# Patient Record
Sex: MALE | Race: WHITE | NOT HISPANIC OR LATINO | Employment: OTHER | URBAN - METROPOLITAN AREA
[De-identification: names, ages, dates, MRNs, and addresses within clinical notes are randomized per-mention and may not be internally consistent; named-entity substitution may affect disease eponyms.]

---

## 2017-01-01 ENCOUNTER — OFFICE VISIT (OUTPATIENT)
Dept: FAMILY MEDICINE | Facility: CLINIC | Age: 77
End: 2017-01-01
Payer: COMMERCIAL

## 2017-01-01 ENCOUNTER — AMBULATORY - HEALTHEAST (OUTPATIENT)
Dept: CARDIOLOGY | Facility: CLINIC | Age: 77
End: 2017-01-01

## 2017-01-01 ENCOUNTER — COMMUNICATION - HEALTHEAST (OUTPATIENT)
Dept: INFUSION THERAPY | Facility: HOSPITAL | Age: 77
End: 2017-01-01

## 2017-01-01 ENCOUNTER — OFFICE VISIT (OUTPATIENT)
Dept: FAMILY MEDICINE | Facility: CLINIC | Age: 77
End: 2017-01-01

## 2017-01-01 ENCOUNTER — TRANSFERRED RECORDS (OUTPATIENT)
Dept: HEALTH INFORMATION MANAGEMENT | Facility: CLINIC | Age: 77
End: 2017-01-01

## 2017-01-01 ENCOUNTER — TELEPHONE (OUTPATIENT)
Dept: FAMILY MEDICINE | Facility: CLINIC | Age: 77
End: 2017-01-01

## 2017-01-01 VITALS
BODY MASS INDEX: 32.15 KG/M2 | HEART RATE: 60 BPM | WEIGHT: 257.2 LBS | TEMPERATURE: 97.5 F | DIASTOLIC BLOOD PRESSURE: 75 MMHG | SYSTOLIC BLOOD PRESSURE: 151 MMHG

## 2017-01-01 VITALS
BODY MASS INDEX: 32.75 KG/M2 | DIASTOLIC BLOOD PRESSURE: 76 MMHG | SYSTOLIC BLOOD PRESSURE: 154 MMHG | WEIGHT: 262 LBS | HEART RATE: 60 BPM | TEMPERATURE: 97.9 F

## 2017-01-01 DIAGNOSIS — M10.9 ACUTE GOUT, UNSPECIFIED CAUSE, UNSPECIFIED SITE: ICD-10-CM

## 2017-01-01 DIAGNOSIS — Z95.0 CARDIAC PACEMAKER IN SITU: ICD-10-CM

## 2017-01-01 DIAGNOSIS — Z23 NEED FOR VACCINATION: Primary | ICD-10-CM

## 2017-01-01 DIAGNOSIS — K21.00 GASTROESOPHAGEAL REFLUX DISEASE WITH ESOPHAGITIS: ICD-10-CM

## 2017-01-01 DIAGNOSIS — R60.0 PEDAL EDEMA: ICD-10-CM

## 2017-01-01 DIAGNOSIS — R10.84 GENERALIZED ABDOMINAL PAIN: ICD-10-CM

## 2017-01-01 DIAGNOSIS — R10.84 ABDOMINAL PAIN, GENERALIZED: Primary | ICD-10-CM

## 2017-01-01 DIAGNOSIS — E78.00 PURE HYPERCHOLESTEROLEMIA: ICD-10-CM

## 2017-01-01 DIAGNOSIS — R10.84 ABDOMINAL PAIN, GENERALIZED: ICD-10-CM

## 2017-01-01 DIAGNOSIS — I48.20 CHRONIC ATRIAL FIBRILLATION (H): ICD-10-CM

## 2017-01-01 LAB
HCC DEVICE COMMENTS: NORMAL
HCC DEVICE COMMENTS: NORMAL

## 2017-01-01 RX ORDER — HYDROCODONE BITARTRATE AND ACETAMINOPHEN 5; 325 MG/1; MG/1
TABLET ORAL
Qty: 59 TABLET | Refills: 0 | Status: SHIPPED | OUTPATIENT
Start: 2017-01-01 | End: 2018-01-01

## 2017-01-01 RX ORDER — FUROSEMIDE 40 MG
80 TABLET ORAL DAILY
Qty: 90 TABLET | Refills: 0 | Status: SHIPPED | OUTPATIENT
Start: 2017-01-01 | End: 2018-01-01

## 2017-01-01 RX ORDER — HYDROCODONE BITARTRATE AND ACETAMINOPHEN 5; 325 MG/1; MG/1
TABLET ORAL
Qty: 18 TABLET | Refills: 0 | Status: SHIPPED | OUTPATIENT
Start: 2017-01-01 | End: 2017-01-01

## 2017-01-01 RX ORDER — ATORVASTATIN CALCIUM 20 MG/1
20 TABLET, FILM COATED ORAL DAILY
Qty: 90 TABLET | Refills: 0 | Status: SHIPPED | OUTPATIENT
Start: 2017-01-01 | End: 2018-01-01

## 2017-01-01 RX ORDER — CARVEDILOL 12.5 MG/1
12.5 TABLET ORAL 2 TIMES DAILY WITH MEALS
Qty: 180 TABLET | Refills: 11 | Status: SHIPPED | OUTPATIENT
Start: 2017-01-01

## 2017-01-01 RX ORDER — ACETAMINOPHEN 325 MG/1
TABLET ORAL
Qty: 60 TABLET | Refills: 1 | Status: SHIPPED | OUTPATIENT
Start: 2017-01-01

## 2017-01-01 RX ORDER — PREDNISONE 20 MG/1
20 TABLET ORAL DAILY
Qty: 5 TABLET | Refills: 0 | Status: SHIPPED | OUTPATIENT
Start: 2017-01-01 | End: 2017-01-01

## 2017-01-01 ASSESSMENT — MIFFLIN-ST. JEOR
SCORE: 1968.6
SCORE: 1949.55

## 2017-01-02 DIAGNOSIS — E78.00 PURE HYPERCHOLESTEROLEMIA: Primary | ICD-10-CM

## 2017-01-04 ENCOUNTER — AMBULATORY - HEALTHEAST (OUTPATIENT)
Dept: CARDIOLOGY | Facility: CLINIC | Age: 77
End: 2017-01-04

## 2017-01-04 ENCOUNTER — TRANSFERRED RECORDS (OUTPATIENT)
Dept: HEALTH INFORMATION MANAGEMENT | Facility: CLINIC | Age: 77
End: 2017-01-04

## 2017-01-04 RX ORDER — ATORVASTATIN CALCIUM 20 MG/1
20 TABLET, FILM COATED ORAL DAILY
Qty: 90 TABLET | Refills: 0 | Status: SHIPPED | OUTPATIENT
Start: 2017-01-04 | End: 2017-04-06

## 2017-01-05 DIAGNOSIS — K21.00 GASTROESOPHAGEAL REFLUX DISEASE WITH ESOPHAGITIS: Primary | ICD-10-CM

## 2017-01-20 ENCOUNTER — TRANSFERRED RECORDS (OUTPATIENT)
Dept: HEALTH INFORMATION MANAGEMENT | Facility: CLINIC | Age: 77
End: 2017-01-20

## 2017-01-20 ENCOUNTER — AMBULATORY - HEALTHEAST (OUTPATIENT)
Dept: CARDIOLOGY | Facility: CLINIC | Age: 77
End: 2017-01-20

## 2017-01-24 ENCOUNTER — OFFICE VISIT (OUTPATIENT)
Dept: FAMILY MEDICINE | Facility: CLINIC | Age: 77
End: 2017-01-24

## 2017-01-24 VITALS
SYSTOLIC BLOOD PRESSURE: 153 MMHG | DIASTOLIC BLOOD PRESSURE: 73 MMHG | WEIGHT: 271.4 LBS | TEMPERATURE: 97.4 F | HEART RATE: 61 BPM | BODY MASS INDEX: 33.92 KG/M2

## 2017-01-24 DIAGNOSIS — K57.30 DIVERTICULOSIS OF LARGE INTESTINE WITHOUT HEMORRHAGE: Primary | ICD-10-CM

## 2017-01-24 RX ORDER — HYDROCODONE BITARTRATE AND ACETAMINOPHEN 5; 325 MG/1; MG/1
1-2 TABLET ORAL EVERY 4 HOURS PRN
Qty: 31 TABLET | Refills: 0 | Status: SHIPPED | OUTPATIENT
Start: 2017-01-24 | End: 2017-02-22

## 2017-01-24 NOTE — MR AVS SNAPSHOT
After Visit Summary   2017    Eliana Bethea    MRN: [id number]           Patient Information     Date Of Birth          1940        Visit Information        Provider Department      2017 9:40 AM Betty Mcrae MD [facility]        Today's Diagnoses     Diverticulosis of large intestine without hemorrhage    -  1        Follow-ups after your visit        Your next 10 appointments already scheduled     2017  9:40 AM   Return Visit with Betty Mcrae MD   [facility] ([facility])    [address] [place] [id number]   [phone]              Who to contact     Please call your clinic at [phone] to:    Ask questions about your health    Make or cancel appointments    Discuss your medicines    Learn about your test results    Speak to your doctor   If you have compliments or concerns about an experience at your clinic, or if you wish to file a complaint, please contact [facility] Patient Relations at [phone] or email us at [email or web address]         Additional Information About Your Visit        MyChart Information     Medicalis is an electronic gateway that provides easy, online access to your medical records. With Medicalis, you can request a clinic appointment, read your test results, renew a prescription or communicate with your care team.     To sign up for Medicalis visit the website at [email or web address]   You will be asked to enter the access code listed below, as well as some personal information. Please follow the directions to create your username and password.     Your access code is: [email or web address]  Expires: 3/20/2017 10:11 AM     Your access code will  in 90 days. If you need help or a new code, please contact your [facility] or call [phone] for assistance.        Care EveryWhere ID     This is your Care EveryWhere ID. This could be used by other organizations to  access your [place] medical records  [id number]        Your Vitals Were     Pulse Temperature                61 97.4  F (36.3  C) (Oral)           Blood Pressure from Last 3 Encounters:   01/24/17 153/73   12/20/16 148/80   11/22/16 143/79    Weight from Last 3 Encounters:   01/24/17 271 lb 6.4 oz (123.106 kg)   12/20/16 271 lb 3.2 oz (123.016 kg)   11/22/16 269 lb 12.8 oz (122.38 kg)              Today, you had the following     No orders found for display         Today's Medication Changes          These changes are accurate as of: 1/24/17  9:38 AM.  If you have any questions, ask your nurse or doctor.               These medicines have changed or have updated prescriptions.        Dose/Directions    * HYDROcodone-acetaminophen 5-325 MG per tablet   Commonly known as:  NORCO   This may have changed:  Another medication with the same name was added. Make sure you understand how and when to take each.   Used for:  Diverticulosis of large intestine without hemorrhage   Changed by:  Moshe Corley MD        1 to 2 per day as needed for abdominal pain   Quantity:  41 tablet   Refills:  0       * HYDROcodone-acetaminophen 5-325 MG per tablet   Commonly known as:  NORCO   This may have changed:  Another medication with the same name was added. Make sure you understand how and when to take each.   Used for:  Diverticulosis of large intestine without hemorrhage   Changed by:  Moshe Corley MD        1-2 for abdominal pain as needed   Quantity:  31 tablet   Refills:  0       * HYDROcodone-acetaminophen 5-325 MG per tablet   Commonly known as:  NORCO   This may have changed:  You were already taking a medication with the same name, and this prescription was added. Make sure you understand how and when to take each.   Used for:  Diverticulosis of large intestine without hemorrhage   Changed by:  Betty Mcrae MD        Dose:  1-2 tablet   Take 1-2 tablets by mouth every 4 hours as needed for moderate to severe pain  maximum 4 tablet(s) per day   Quantity:  31 tablet   Refills:  0       * Notice:  This list has 3 medication(s) that are the same as other medications prescribed for you. Read the directions carefully, and ask your doctor or other care provider to review them with you.         Where to get your medicines      Some of these will need a paper prescription and others can be bought over the counter.  Ask your nurse if you have questions.     Bring a paper prescription for each of these medications    - HYDROcodone-acetaminophen 5-325 MG per tablet             Primary Care Provider Office Phone # Fax #    Moshe Corley [phone]       [address] [address]        Thank you!     Thank you for choosing [facility]  for your care. Our goal is always to provide you with excellent care. Hearing back from our patients is one way we can continue to improve our services. Please take a few minutes to complete the written survey that you may receive in the mail after your visit with us. Thank you!             Your Updated Medication List - Protect others around you: Learn how to safely use, store and throw away your medicines at www.disposemymeds.org.          This list is accurate as of: 1/24/17  9:38 AM.  Always use your most recent med list.                   Brand Name Dispense Instructions for use    * acetaminophen 325 MG tablet    TYLENOL    60 tablet    Twice a day as needed for  abdominal pain       * acetaminophen 325 MG tablet    TYLENOL    60 tablet    650 mg bid as needed for  Pain NO MORE THAN 1300 mg per day       amLODIPine 2.5 MG tablet    NORVASC    30 tablet    Take 2 tablets (5 mg) by mouth daily       atorvastatin 20 MG tablet    LIPITOR    90 tablet    Take 1 tablet (20 mg) by mouth daily       carvedilol 12.5 MG tablet    COREG    180 tablet    Take 1 tablet (12.5 mg) by mouth 2 times daily (with meals)       furosemide 40 MG tablet    LASIX    90  tablet    Take 2 tablets (80 mg) by mouth daily       * HYDROcodone-acetaminophen 5-325 MG per tablet    NORCO    41 tablet    1 to 2 per day as needed for abdominal pain       * HYDROcodone-acetaminophen 5-325 MG per tablet    NORCO    31 tablet    1-2 for abdominal pain as needed       * HYDROcodone-acetaminophen 5-325 MG per tablet    NORCO    31 tablet    Take 1-2 tablets by mouth every 4 hours as needed for moderate to severe pain maximum 4 tablet(s) per day       lisinopril 40 MG tablet    PRINIVIL/ZESTRIL    30 tablet    Take 1 tablet (40 mg) by mouth daily Discontinue lisinopril 30 mg daily.  (dose increase)       omeprazole 20 MG CR capsule    priLOSEC    90 capsule    Take 1 capsule (20 mg) by mouth daily       rivaroxaban ANTICOAGULANT 20 MG Tabs tablet    XARELTO    30 tablet    Take 1 tablet (20 mg) by mouth daily (with dinner)       Simethicone 125 MG Caps     28 capsule    1 to 3 per day  Per gas pain       spironolactone 25 MG tablet    ALDACTONE    30 tablet    Take 1 tablet (25 mg) by mouth daily       * Notice:  This list has 5 medication(s) that are the same as other medications prescribed for you. Read the directions carefully, and ask your doctor or other care provider to review them with you.

## 2017-01-24 NOTE — PROGRESS NOTES
Preceptor attestation:  Patient seen and discussed with the resident.  Assessment and plan reviewed with resident and agreed upon.  Supervising physician: Alfonso Rico

## 2017-01-24 NOTE — Clinical Note
Will have our referral desk see if they can track down US and CT ordered by [facility] earlier this month, as the patient would like to review it with Dr. Corley

## 2017-01-25 ENCOUNTER — TELEPHONE (OUTPATIENT)
Dept: FAMILY MEDICINE | Facility: CLINIC | Age: 77
End: 2017-01-25

## 2017-01-25 NOTE — TELEPHONE ENCOUNTER
Questions the patient's xarelto can be put on on for 3 days prior to bx of a cyst he has. Please advise. /JAM Chandra  Routed to Pharm D  /JAM Chandra  .

## 2017-01-25 NOTE — TELEPHONE ENCOUNTER
[facility] phone call message- general phone call:    Reason for call: Would like to speak with a nurse or Dr. Corley in regards to trying to hold the patient's medication for his biopsy.     Return call needed: Yes    OK to leave a message on voice mail? Yes    Primary language: English      needed? No    Call taken on January 25, 2017 at 1:50 PM by Jose Manuel Kelley

## 2017-01-26 NOTE — TELEPHONE ENCOUNTER
This is ok.  No need to bridge.  I called Sparkle back to let her know this.  Elizabeth Ayers, Pharm.D.

## 2017-01-31 ENCOUNTER — TELEPHONE (OUTPATIENT)
Dept: FAMILY MEDICINE | Facility: CLINIC | Age: 77
End: 2017-01-31

## 2017-01-31 DIAGNOSIS — J06.9 UPPER RESPIRATORY TRACT INFECTION, UNSPECIFIED TYPE: Primary | ICD-10-CM

## 2017-01-31 RX ORDER — GUAIFENESIN 200 MG/10ML
10 LIQUID ORAL 2 TIMES DAILY PRN
Qty: 118 ML | Refills: 0 | Status: SHIPPED | OUTPATIENT
Start: 2017-01-31 | End: 2017-07-20

## 2017-01-31 NOTE — TELEPHONE ENCOUNTER
Pt c/o nasal congestion and coughing for a wk now. Productive cough. Denies fevers, chills, vomiting. Able to drink water and eat food. Pt has not been taking any meds. Pt would like Dr. Corley to send something for cough and congestion. Advised pt on home comfort measures such as water, rest, hot water and honey. Pt states he has endoscopy scheduled soon and would like med to help with his symptoms.     Would like Rx sent to [facility] on [address] in [place].    Routed to Dr. Corley. /JAM Albarran

## 2017-01-31 NOTE — TELEPHONE ENCOUNTER
Gave msg to pt. guaifenesin sent. Any new symptoms, such as fever, chills, call clinic.   Routed to Dr. Corley. /JAM Albarran

## 2017-01-31 NOTE — TELEPHONE ENCOUNTER
[facility] phone call message-patient reporting a symptom:     Symptom: pt has been getting a cold off and on and he is wondering if Dr Corley will send something for him to [facility] on [address].  Please call.    Same Day Visit Offered: no    Additional comments: none    OK to leave message on voice mail? Yes    Primary language: English      needed? No    Call taken on January 31, 2017 at 8:34 AM by Rai Pate

## 2017-02-22 ENCOUNTER — OFFICE VISIT (OUTPATIENT)
Dept: FAMILY MEDICINE | Facility: CLINIC | Age: 77
End: 2017-02-22

## 2017-02-22 VITALS
SYSTOLIC BLOOD PRESSURE: 166 MMHG | HEART RATE: 60 BPM | DIASTOLIC BLOOD PRESSURE: 79 MMHG | BODY MASS INDEX: 33.17 KG/M2 | TEMPERATURE: 97.6 F | WEIGHT: 265.4 LBS

## 2017-02-22 DIAGNOSIS — K57.30 DIVERTICULOSIS OF LARGE INTESTINE WITHOUT HEMORRHAGE: ICD-10-CM

## 2017-02-22 DIAGNOSIS — M10.00 ACUTE IDIOPATHIC GOUT, UNSPECIFIED SITE: Primary | ICD-10-CM

## 2017-02-22 RX ORDER — HYDROCODONE BITARTRATE AND ACETAMINOPHEN 5; 325 MG/1; MG/1
TABLET ORAL
Qty: 31 TABLET | Refills: 0 | Status: SHIPPED | OUTPATIENT
Start: 2017-02-22 | End: 2017-07-20

## 2017-02-22 RX ORDER — HYDROCODONE BITARTRATE AND ACETAMINOPHEN 5; 325 MG/1; MG/1
1-2 TABLET ORAL EVERY 4 HOURS PRN
Qty: 31 TABLET | Refills: 0 | Status: SHIPPED | OUTPATIENT
Start: 2017-02-22 | End: 2017-02-22 | Stop reason: DRUGHIGH

## 2017-02-22 RX ORDER — INDOMETHACIN 75 MG/1
75 CAPSULE, EXTENDED RELEASE ORAL 2 TIMES DAILY WITH MEALS
Qty: 30 CAPSULE | Refills: 0 | Status: SHIPPED | OUTPATIENT
Start: 2017-02-22 | End: 2017-04-18

## 2017-02-22 NOTE — PROGRESS NOTES
S: Eliana Bethea is a 76 year old male who returns for follow up of   Abdominal pain, S/P ERC- biopsy Had follow with Gastroenterology today to talk about his  liver cirrhosis  .    PMHX/PSHX/MEDS/ALLERGIES/SHX/FHX reviewed and updated in Epic.      ROS:  General: No fevers, chills  Head: No headache  Ears: No acute change in hearing.    CV: No chest pain or palpitations.  Resp: No shortness of breath.  No cough. No hemoptysis.  GI: No nausea, vomiting, constipation, diarrhea  : No urinary pains    O: /79 (BP Location: Right arm, Patient Position: Chair)  Pulse 60  Temp 97.6  F (36.4  C) (Oral)  Wt 265 lb 6.4 oz (120.4 kg)  BMI 33.17 kg/m2   Gen:  Well nourished and in NAD    CV:  RRR  - no murmurs, rubs, or gallups,   Pulm:  CTAB, no wheezes/rales/rhonchi, good air entry   ABD: soft, nontender, no masses, no rebound, BS intact throughout  Extrem: no cyanosis, edema or clubbing  Psych: Euthymic      (K57.30) Diverticulosis of large intestine without hemorrhage  Comment:   Plan: HYDROcodone-acetaminophen (NORCO) 5-325 MG per         tablet       2 Cirrhosis; fu with gastro         3 Gout/hands with flare; indocin, offered  steroids    today  RTC in 1 weeks, for follow up of  Gout or sooner if develops new or worsening symptoms.    Moshe Corley

## 2017-02-22 NOTE — MR AVS SNAPSHOT
After Visit Summary   2017    Eliana Bethea    MRN: [id number]           Patient Information     Date Of Birth          1940        Visit Information        Provider Department      2017 9:40 AM Moshe Corley MD [facility]        Today's Diagnoses     Acute idiopathic gout, unspecified site    -  1    Diverticulosis of large intestine without hemorrhage           Follow-ups after your visit        Who to contact     Please call your clinic at [phone] to:    Ask questions about your health    Make or cancel appointments    Discuss your medicines    Learn about your test results    Speak to your doctor   If you have compliments or concerns about an experience at your clinic, or if you wish to file a complaint, please contact [facility] Patient Relations at [phone] or email us at [email or web address]         Additional Information About Your Visit        MyChart Information     mParticle is an electronic gateway that provides easy, online access to your medical records. With mParticle, you can request a clinic appointment, read your test results, renew a prescription or communicate with your care team.     To sign up for textmetixt visit the website at [email or web address]   You will be asked to enter the access code listed below, as well as some personal information. Please follow the directions to create your username and password.     Your access code is: [email or web address]  Expires: 3/20/2017 10:11 AM     Your access code will  in 90 days. If you need help or a new code, please contact your [facility] or call [phone] for assistance.        Care EveryWhere ID     This is your Care EveryWhere ID. This could be used by other organizations to access your [place] medical records  [id number]        Your Vitals Were     Pulse Temperature BMI (Body Mass Index)             60 97.6  F (36.4  C) (Oral)  33.17 kg/m2          Blood Pressure from Last 3 Encounters:   02/22/17 166/79   01/24/17 153/73   12/20/16 148/80    Weight from Last 3 Encounters:   02/22/17 265 lb 6.4 oz (120.4 kg)   01/24/17 271 lb 6.4 oz (123.1 kg)   12/20/16 271 lb 3.2 oz (123 kg)              Today, you had the following     No orders found for display         Today's Medication Changes          These changes are accurate as of: 2/22/17  3:43 PM.  If you have any questions, ask your nurse or doctor.               Start taking these medicines.        Dose/Directions    indomethacin 75 MG CR capsule   Commonly known as:  INDOCIN SR   Used for:  Acute idiopathic gout, unspecified site   Started by:  Moshe Corley MD        Dose:  75 mg   Take 1 capsule (75 mg) by mouth 2 times daily (with meals)   Quantity:  30 capsule   Refills:  0         These medicines have changed or have updated prescriptions.        Dose/Directions    * HYDROcodone-acetaminophen 5-325 MG per tablet   Commonly known as:  JEROME   This may have changed:  Another medication with the same name was changed. Make sure you understand how and when to take each.   Used for:  Diverticulosis of large intestine without hemorrhage   Changed by:  Moshe Corley MD        1 to 2 per day as needed for abdominal pain   Quantity:  41 tablet   Refills:  0       * HYDROcodone-acetaminophen 5-325 MG per tablet   Commonly known as:  NORCO   This may have changed:  Another medication with the same name was changed. Make sure you understand how and when to take each.   Used for:  Diverticulosis of large intestine without hemorrhage   Changed by:  Moshe Corley MD        1-2 for abdominal pain as needed   Quantity:  31 tablet   Refills:  0       * HYDROcodone-acetaminophen 5-325 MG per tablet   Commonly known as:  NORCO   This may have changed:    - how much to take  - how to take this  - when to take this  - reasons to take this  - additional instructions   Used for:  Diverticulosis of large  intestine without hemorrhage   Changed by:  Moshe Corley MD        Once to twice a day  As needed  for abdominal pain/diverticulosis  maximum 2 tablet(s) per day   Quantity:  31 tablet   Refills:  0       * Notice:  This list has 3 medication(s) that are the same as other medications prescribed for you. Read the directions carefully, and ask your doctor or other care provider to review them with you.         Where to get your medicines      These medications were sent to [id number] - [place], [place] - [address] AT [address] [facility] [address]  [address], [place] [address]     Phone:  [phone]     indomethacin 75 MG CR capsule         Some of these will need a paper prescription and others can be bought over the counter.  Ask your nurse if you have questions.     Bring a paper prescription for each of these medications     HYDROcodone-acetaminophen 5-325 MG per tablet                Primary Care Provider Office Phone # Fax #    Msohe Corley [phone]       [address] [address]        Thank you!     Thank you for choosing [facility]  for your care. Our goal is always to provide you with excellent care. Hearing back from our patients is one way we can continue to improve our services. Please take a few minutes to complete the written survey that you may receive in the mail after your visit with us. Thank you!             Your Updated Medication List - Protect others around you: Learn how to safely use, store and throw away your medicines at www.disposemymeds.org.          This list is accurate as of: 2/22/17  3:43 PM.  Always use your most recent med list.                   Brand Name Dispense Instructions for use    * acetaminophen 325 MG tablet    TYLENOL    60 tablet    Twice a day as needed for  abdominal pain       * acetaminophen 325 MG tablet    TYLENOL    60 tablet    650 mg bid as needed for  Pain NO MORE THAN 1300  mg per day       amLODIPine 2.5 MG tablet    NORVASC    30 tablet    Take 2 tablets (5 mg) by mouth daily       atorvastatin 20 MG tablet    LIPITOR    90 tablet    Take 1 tablet (20 mg) by mouth daily       carvedilol 12.5 MG tablet    COREG    180 tablet    Take 1 tablet (12.5 mg) by mouth 2 times daily (with meals)       furosemide 40 MG tablet    LASIX    90 tablet    Take 2 tablets (80 mg) by mouth daily       * guaiFENesin 100 MG/5ML Syrp    ROBITUSSIN CHEST CONGESTION    118 mL    Bid as needed for chest cold       * guaiFENesin 100 MG/5ML Liqd    ROBITUSSIN    118 mL    Take 10 mLs by mouth 2 times daily as needed for cough       * HYDROcodone-acetaminophen 5-325 MG per tablet    NORCO    41 tablet    1 to 2 per day as needed for abdominal pain       * HYDROcodone-acetaminophen 5-325 MG per tablet    NORCO    31 tablet    1-2 for abdominal pain as needed       * HYDROcodone-acetaminophen 5-325 MG per tablet    NORCO    31 tablet    Once to twice a day  As needed  for abdominal pain/diverticulosis  maximum 2 tablet(s) per day       indomethacin 75 MG CR capsule    INDOCIN SR    30 capsule    Take 1 capsule (75 mg) by mouth 2 times daily (with meals)       lisinopril 40 MG tablet    PRINIVIL/ZESTRIL    30 tablet    Take 1 tablet (40 mg) by mouth daily Discontinue lisinopril 30 mg daily.  (dose increase)       omeprazole 20 MG CR capsule    priLOSEC    90 capsule    Take 1 capsule (20 mg) by mouth daily       rivaroxaban ANTICOAGULANT 20 MG Tabs tablet    XARELTO    30 tablet    Take 1 tablet (20 mg) by mouth daily (with dinner)       Simethicone 125 MG Caps     28 capsule    1 to 3 per day  Per gas pain       spironolactone 25 MG tablet    ALDACTONE    30 tablet    Take 1 tablet (25 mg) by mouth daily       * Notice:  This list has 7 medication(s) that are the same as other medications prescribed for you. Read the directions carefully, and ask your doctor or other care provider to review them with you.

## 2017-03-02 ENCOUNTER — AMBULATORY - HEALTHEAST (OUTPATIENT)
Dept: CARDIOLOGY | Facility: CLINIC | Age: 77
End: 2017-03-02

## 2017-03-21 ENCOUNTER — OFFICE VISIT (OUTPATIENT)
Dept: FAMILY MEDICINE | Facility: CLINIC | Age: 77
End: 2017-03-21

## 2017-03-21 VITALS
SYSTOLIC BLOOD PRESSURE: 157 MMHG | HEART RATE: 59 BPM | WEIGHT: 272.4 LBS | TEMPERATURE: 98 F | DIASTOLIC BLOOD PRESSURE: 82 MMHG | BODY MASS INDEX: 34.05 KG/M2

## 2017-03-21 DIAGNOSIS — M10.071 ACUTE IDIOPATHIC GOUT OF RIGHT FOOT: ICD-10-CM

## 2017-03-21 DIAGNOSIS — K59.01 SLOW TRANSIT CONSTIPATION: Primary | ICD-10-CM

## 2017-03-21 DIAGNOSIS — R10.84 GENERALIZED ABDOMINAL PAIN: ICD-10-CM

## 2017-03-21 RX ORDER — HYDROCODONE BITARTRATE AND ACETAMINOPHEN 5; 325 MG/1; MG/1
TABLET ORAL
Qty: 31 TABLET | Refills: 0 | Status: SHIPPED | OUTPATIENT
Start: 2017-03-21 | End: 2017-07-20

## 2017-03-21 NOTE — PROGRESS NOTES
S: Eliana Bethea is a 77 year old male who returns for follow up of  Abdominal pain/ needs refills of Linzess  Patients states that main concern today is  Pain meds refills   Follows with GI for diverticular dz     PMHX/PSHX/MEDS/ALLERGIES/SHX/FHX reviewed and updated in Epic.      ROS:  General: No fevers, chills  Head: No headache  Ears: No acute change in hearing.    CV: No chest pain or palpitations.  Resp: No shortness of breath.  No cough. No hemoptysis.  GI: No nausea, vomiting, constipation, diarrhea  : No urinary pains    O: /82  Pulse 59  Temp 98  F (36.7  C) (Oral)  Wt 272 lb 6.4 oz (123.6 kg)  BMI 34.05 kg/m2   Gen:  Well nourished and in NAD    CV:  IRRR  - no murmurs, rubs, or gallups,   Pulm:  CTAB, no wheezes/rales/rhonchi, good air entry   ABD: soft, nontender, no masses, no rebound, BS intact throughout  Extrem: no cyanosis, edema or clubbing  Psych: Euthymic      1 Abdominal pain/ diverticulosis   Refills: Linzess   Vicodin/few   2 Afib: xarelto  3 Gout :ok for NSAID for  Attacks, discussed risk of bleeding with xarelto      RTC in 6 weeks, for follow up of  Abdominal pain  or sooner if develops new or worsening symptoms.    Moshe Corley

## 2017-03-21 NOTE — MR AVS SNAPSHOT
After Visit Summary   3/21/2017    Eliana Bethea    MRN: [id number]           Patient Information     Date Of Birth          1940        Visit Information        Provider Department      3/21/2017 9:00 AM Moshe Corley MD [facility]        Today's Diagnoses     Slow transit constipation    -  1    Generalized abdominal pain        Acute idiopathic gout of right foot           Follow-ups after your visit        Who to contact     Please call your clinic at [phone] to:    Ask questions about your health    Make or cancel appointments    Discuss your medicines    Learn about your test results    Speak to your doctor   If you have compliments or concerns about an experience at your clinic, or if you wish to file a complaint, please contact [facility] Patient Relations at [phone] or email us at [email or web address]         Additional Information About Your Visit        MyChart Information     [employer] is an electronic gateway that provides easy, online access to your medical records. With [employer], you can request a clinic appointment, read your test results, renew a prescription or communicate with your care team.     To sign up for [employer] visit the website at [email or web address]   You will be asked to enter the access code listed below, as well as some personal information. Please follow the directions to create your username and password.     Your access code is: [email or web address]  Expires: 2017  9:41 PM     Your access code will  in 90 days. If you need help or a new code, please contact your [facility] or call [phone] for assistance.        Care EveryWhere ID     This is your Care EveryWhere ID. This could be used by other organizations to access your [place] medical records  [id number]        Your Vitals Were     Pulse Temperature BMI (Body Mass Index)             59 98  F (36.7  C) (Oral)  34.05 kg/m2          Blood Pressure from Last 3 Encounters:   03/21/17 157/82   02/22/17 166/79   01/24/17 153/73    Weight from Last 3 Encounters:   03/21/17 272 lb 6.4 oz (123.6 kg)   02/22/17 265 lb 6.4 oz (120.4 kg)   01/24/17 271 lb 6.4 oz (123.1 kg)              Today, you had the following     No orders found for display         Today's Medication Changes          These changes are accurate as of: 3/21/17  9:41 PM.  If you have any questions, ask your nurse or doctor.               Start taking these medicines.        Dose/Directions    linaclotide 145 MCG capsule   Commonly known as:  LINZESS   Used for:  Slow transit constipation   Started by:  Moshe Corley MD        Dose:  145 mcg   Take 1 capsule (145 mcg) by mouth every morning (before breakfast)   Quantity:  30 capsule   Refills:  3       naproxen 375 MG tablet   Commonly known as:  NAPROSYN   Used for:  Acute idiopathic gout of right foot   Started by:  Moshe Corley MD        As needed for gout   Quantity:  30 tablet   Refills:  0         These medicines have changed or have updated prescriptions.        Dose/Directions    * HYDROcodone-acetaminophen 5-325 MG per tablet   Commonly known as:  NORCO   This may have changed:  Another medication with the same name was added. Make sure you understand how and when to take each.   Used for:  Diverticulosis of large intestine without hemorrhage   Changed by:  Moshe Corley MD        1 to 2 per day as needed for abdominal pain   Quantity:  41 tablet   Refills:  0       * HYDROcodone-acetaminophen 5-325 MG per tablet   Commonly known as:  NORCO   This may have changed:  Another medication with the same name was added. Make sure you understand how and when to take each.   Used for:  Diverticulosis of large intestine without hemorrhage   Changed by:  Moshe Corley MD        1-2 for abdominal pain as needed   Quantity:  31 tablet   Refills:  0       * HYDROcodone-acetaminophen 5-325 MG per tablet    Commonly known as:  NORCO   This may have changed:  Another medication with the same name was added. Make sure you understand how and when to take each.   Used for:  Diverticulosis of large intestine without hemorrhage   Changed by:  Moshe Corley MD        Once to twice a day  As needed  for abdominal pain/diverticulosis  maximum 2 tablet(s) per day   Quantity:  31 tablet   Refills:  0       * HYDROcodone-acetaminophen 5-325 MG per tablet   Commonly known as:  NORCO   This may have changed:  You were already taking a medication with the same name, and this prescription was added. Make sure you understand how and when to take each.   Used for:  Generalized abdominal pain   Changed by:  Moshe Corely MD        1 to 2 per daymaximum 2 tablet(s) per day   Quantity:  31 tablet   Refills:  0       * Notice:  This list has 4 medication(s) that are the same as other medications prescribed for you. Read the directions carefully, and ask your doctor or other care provider to review them with you.         Where to get your medicines      These medications were sent to [facility] - [place], [place] - [address] AT [facility]  [address], [place] [address]     Phone:  [phone]     linaclotide 145 MCG capsule    naproxen 375 MG tablet         Some of these will need a paper prescription and others can be bought over the counter.  Ask your nurse if you have questions.     Bring a paper prescription for each of these medications     HYDROcodone-acetaminophen 5-325 MG per tablet                Primary Care Provider Office Phone # Fax #    Moshe Corley [phone]       [address] [address]        Thank you!     Thank you for choosing [facility]  for your care. Our goal is always to provide you with excellent care. Hearing back from our patients is one way we can continue to improve our services. Please take a few minutes  to complete the written survey that you may receive in the mail after your visit with us. Thank you!             Your Updated Medication List - Protect others around you: Learn how to safely use, store and throw away your medicines at www.disposemymeds.org.          This list is accurate as of: 3/21/17  9:41 PM.  Always use your most recent med list.                   Brand Name Dispense Instructions for use    * acetaminophen 325 MG tablet    TYLENOL    60 tablet    Twice a day as needed for  abdominal pain       * acetaminophen 325 MG tablet    TYLENOL    60 tablet    650 mg bid as needed for  Pain NO MORE THAN 1300 mg per day       amLODIPine 2.5 MG tablet    NORVASC    30 tablet    Take 2 tablets (5 mg) by mouth daily       atorvastatin 20 MG tablet    LIPITOR    90 tablet    Take 1 tablet (20 mg) by mouth daily       carvedilol 12.5 MG tablet    COREG    180 tablet    Take 1 tablet (12.5 mg) by mouth 2 times daily (with meals)       furosemide 40 MG tablet    LASIX    90 tablet    Take 2 tablets (80 mg) by mouth daily       * guaiFENesin 100 MG/5ML Syrp    ROBITUSSIN CHEST CONGESTION    118 mL    Bid as needed for chest cold       * guaiFENesin 100 MG/5ML Liqd    ROBITUSSIN    118 mL    Take 10 mLs by mouth 2 times daily as needed for cough       * HYDROcodone-acetaminophen 5-325 MG per tablet    NORCO    41 tablet    1 to 2 per day as needed for abdominal pain       * HYDROcodone-acetaminophen 5-325 MG per tablet    NORCO    31 tablet    1-2 for abdominal pain as needed       * HYDROcodone-acetaminophen 5-325 MG per tablet    NORCO    31 tablet    Once to twice a day  As needed  for abdominal pain/diverticulosis  maximum 2 tablet(s) per day       * HYDROcodone-acetaminophen 5-325 MG per tablet    NORCO    31 tablet    1 to 2 per daymaximum 2 tablet(s) per day       indomethacin 75 MG CR capsule    INDOCIN SR    30 capsule    Take 1 capsule (75 mg) by mouth 2 times daily (with meals)       linaclotide 145 MCG  capsule    LINZESS    30 capsule    Take 1 capsule (145 mcg) by mouth every morning (before breakfast)       lisinopril 40 MG tablet    PRINIVIL/ZESTRIL    30 tablet    Take 1 tablet (40 mg) by mouth daily Discontinue lisinopril 30 mg daily.  (dose increase)       naproxen 375 MG tablet    NAPROSYN    30 tablet    As needed for gout       omeprazole 20 MG CR capsule    priLOSEC    90 capsule    Take 1 capsule (20 mg) by mouth daily       rivaroxaban ANTICOAGULANT 20 MG Tabs tablet    XARELTO    30 tablet    Take 1 tablet (20 mg) by mouth daily (with dinner)       Simethicone 125 MG Caps     28 capsule    1 to 3 per day  Per gas pain       spironolactone 25 MG tablet    ALDACTONE    30 tablet    Take 1 tablet (25 mg) by mouth daily       * Notice:  This list has 8 medication(s) that are the same as other medications prescribed for you. Read the directions carefully, and ask your doctor or other care provider to review them with you.

## 2017-04-04 DIAGNOSIS — R60.0 PEDAL EDEMA: ICD-10-CM

## 2017-04-04 DIAGNOSIS — K21.00 GASTROESOPHAGEAL REFLUX DISEASE WITH ESOPHAGITIS: ICD-10-CM

## 2017-04-04 RX ORDER — FUROSEMIDE 40 MG
80 TABLET ORAL DAILY
Qty: 90 TABLET | Refills: 0 | Status: SHIPPED | OUTPATIENT
Start: 2017-04-04 | End: 2017-08-30

## 2017-04-06 DIAGNOSIS — E78.00 PURE HYPERCHOLESTEROLEMIA: ICD-10-CM

## 2017-04-06 RX ORDER — ATORVASTATIN CALCIUM 20 MG/1
20 TABLET, FILM COATED ORAL DAILY
Qty: 90 TABLET | Refills: 0 | Status: SHIPPED | OUTPATIENT
Start: 2017-04-06 | End: 2017-06-28

## 2017-04-18 ENCOUNTER — OFFICE VISIT (OUTPATIENT)
Dept: FAMILY MEDICINE | Facility: CLINIC | Age: 77
End: 2017-04-18

## 2017-04-18 VITALS — DIASTOLIC BLOOD PRESSURE: 60 MMHG | TEMPERATURE: 97.6 F | SYSTOLIC BLOOD PRESSURE: 100 MMHG | HEART RATE: 60 BPM

## 2017-04-18 DIAGNOSIS — R10.13 ABDOMINAL PAIN, EPIGASTRIC: Primary | ICD-10-CM

## 2017-04-18 DIAGNOSIS — M10.00 ACUTE IDIOPATHIC GOUT, UNSPECIFIED SITE: ICD-10-CM

## 2017-04-18 DIAGNOSIS — K59.01 SLOW TRANSIT CONSTIPATION: ICD-10-CM

## 2017-04-18 RX ORDER — INDOMETHACIN 75 MG/1
75 CAPSULE, EXTENDED RELEASE ORAL 2 TIMES DAILY WITH MEALS
Qty: 30 CAPSULE | Refills: 0 | Status: SHIPPED | OUTPATIENT
Start: 2017-04-18 | End: 2018-01-01

## 2017-04-18 RX ORDER — HYDROCODONE BITARTRATE AND ACETAMINOPHEN 5; 325 MG/1; MG/1
TABLET ORAL
Qty: 29 TABLET | Refills: 0 | Status: SHIPPED | OUTPATIENT
Start: 2017-04-18 | End: 2017-07-20

## 2017-04-18 NOTE — MR AVS SNAPSHOT
After Visit Summary   2017    Eliana Bethea    MRN: [id number]           Patient Information     Date Of Birth          1940        Visit Information        Provider Department      2017 9:40 AM Moshe Corley MD [facility]        Today's Diagnoses     Abdominal pain, epigastric    -  1    Slow transit constipation        Acute idiopathic gout, unspecified site          Care Instructions    Refill meds for constipation   Few pain ( Vicodin ) Given  Fu 4 weeks        Follow-ups after your visit        Your next 10 appointments already scheduled     2017  9:40 AM CDT   Return Visit with Moshe Corley MD   [facility] ([facility])    [address] [place] [address]   [phone]              Who to contact     Please call your clinic at [phone] to:    Ask questions about your health    Make or cancel appointments    Discuss your medicines    Learn about your test results    Speak to your doctor   If you have compliments or concerns about an experience at your clinic, or if you wish to file a complaint, please contact [facility] Patient Relations at [phone] or email us at [email or web address]         Additional Information About Your Visit        MyChart Information     [employer] is an electronic gateway that provides easy, online access to your medical records. With [employer], you can request a clinic appointment, read your test results, renew a prescription or communicate with your care team.     To sign up for [employer] visit the website at [email or web address]   You will be asked to enter the access code listed below, as well as some personal information. Please follow the directions to create your username and password.     Your access code is: [email or web address]  Expires: 2017  9:41 PM     Your access code will  in 90 days. If you need help or a new code, please contact your [facility] or call [phone] for assistance.        Care EveryWhere ID     This is your Care EveryWhere ID. This could be used by other organizations to access your [place] medical records  [id number]        Your Vitals Were     Pulse Temperature                60 97.6  F (36.4  C) (Oral)           Blood Pressure from Last 3 Encounters:   04/18/17 100/60   03/21/17 157/82   02/22/17 166/79    Weight from Last 3 Encounters:   03/21/17 272 lb 6.4 oz (123.6 kg)   02/22/17 265 lb 6.4 oz (120.4 kg)   01/24/17 271 lb 6.4 oz (123.1 kg)              Today, you had the following     No orders found for display         Today's Medication Changes          These changes are accurate as of: 4/18/17  9:32 AM.  If you have any questions, ask your nurse or doctor.               These medicines have changed or have updated prescriptions.        Dose/Directions    * HYDROcodone-acetaminophen 5-325 MG per tablet   Commonly known as:  NORCO   This may have changed:  Another medication with the same name was added. Make sure you understand how and when to take each.   Used for:  Diverticulosis of large intestine without hemorrhage   Changed by:  Moseh Corley MD        1 to 2 per day as needed for abdominal pain   Quantity:  41 tablet   Refills:  0       * HYDROcodone-acetaminophen 5-325 MG per tablet   Commonly known as:  NORCO   This may have changed:  Another medication with the same name was added. Make sure you understand how and when to take each.   Used for:  Diverticulosis of large intestine without hemorrhage   Changed by:  Moshe Corley MD        1-2 for abdominal pain as needed   Quantity:  31 tablet   Refills:  0       * HYDROcodone-acetaminophen 5-325 MG per tablet   Commonly known as:  NORCO   This may have changed:  Another medication with the same name was added. Make sure you understand how and when to take each.   Used for:  Diverticulosis of large intestine without hemorrhage   Changed by:  Barney  MD Moshe        Once to twice a day  As needed  for abdominal pain/diverticulosis  maximum 2 tablet(s) per day   Quantity:  31 tablet   Refills:  0       * HYDROcodone-acetaminophen 5-325 MG per tablet   Commonly known as:  NORCO   This may have changed:  Another medication with the same name was added. Make sure you understand how and when to take each.   Used for:  Generalized abdominal pain   Changed by:  Moshe Corley MD        1 to 2 per daymaximum 2 tablet(s) per day   Quantity:  31 tablet   Refills:  0       * HYDROcodone-acetaminophen 5-325 MG per tablet   Commonly known as:  NORCO   This may have changed:  You were already taking a medication with the same name, and this prescription was added. Make sure you understand how and when to take each.   Used for:  Abdominal pain, epigastric   Changed by:  Moshe Corley MD        1 to 2 maximum 2  tablet(s) per day   Quantity:  29 tablet   Refills:  0       * Notice:  This list has 5 medication(s) that are the same as other medications prescribed for you. Read the directions carefully, and ask your doctor or other care provider to review them with you.         Where to get your medicines      These medications were sent to [facility] - [place], [place] - [address] [place] [address] AT [facility]  [address], [place] [address]     Phone:  [phone]     indomethacin 75 MG CR capsule    linaclotide 145 MCG capsule         Some of these will need a paper prescription and others can be bought over the counter.  Ask your nurse if you have questions.     Bring a paper prescription for each of these medications     HYDROcodone-acetaminophen 5-325 MG per tablet                Primary Care Provider Office Phone # Fax #    Moshe Corley -[phone]       [facility] [address] [facility] [address]        Thank you!     Thank you for choosing [facility]  for your care. Our goal is always to provide you with  excellent care. Hearing back from our patients is one way we can continue to improve our services. Please take a few minutes to complete the written survey that you may receive in the mail after your visit with us. Thank you!             Your Updated Medication List - Protect others around you: Learn how to safely use, store and throw away your medicines at www.disposemymeds.org.          This list is accurate as of: 4/18/17  9:32 AM.  Always use your most recent med list.                   Brand Name Dispense Instructions for use    * acetaminophen 325 MG tablet    TYLENOL    60 tablet    Twice a day as needed for  abdominal pain       * acetaminophen 325 MG tablet    TYLENOL    60 tablet    650 mg bid as needed for  Pain NO MORE THAN 1300 mg per day       amLODIPine 2.5 MG tablet    NORVASC    30 tablet    Take 2 tablets (5 mg) by mouth daily       atorvastatin 20 MG tablet    LIPITOR    90 tablet    Take 1 tablet (20 mg) by mouth daily       carvedilol 12.5 MG tablet    COREG    180 tablet    Take 1 tablet (12.5 mg) by mouth 2 times daily (with meals)       furosemide 40 MG tablet    LASIX    90 tablet    Take 2 tablets (80 mg) by mouth daily       * guaiFENesin 100 MG/5ML Syrp    ROBITUSSIN CHEST CONGESTION    118 mL    Bid as needed for chest cold       * guaiFENesin 100 MG/5ML Liqd    ROBITUSSIN    118 mL    Take 10 mLs by mouth 2 times daily as needed for cough       * HYDROcodone-acetaminophen 5-325 MG per tablet    NORCO    41 tablet    1 to 2 per day as needed for abdominal pain       * HYDROcodone-acetaminophen 5-325 MG per tablet    NORCO    31 tablet    1-2 for abdominal pain as needed       * HYDROcodone-acetaminophen 5-325 MG per tablet    NORCO    31 tablet    Once to twice a day  As needed  for abdominal pain/diverticulosis  maximum 2 tablet(s) per day       * HYDROcodone-acetaminophen 5-325 MG per tablet    NORCO    31 tablet    1 to 2 per daymaximum 2 tablet(s) per day       *  HYDROcodone-acetaminophen 5-325 MG per tablet    NORCO    29 tablet    1 to 2 maximum 2  tablet(s) per day       indomethacin 75 MG CR capsule    INDOCIN SR    30 capsule    Take 1 capsule (75 mg) by mouth 2 times daily (with meals)       linaclotide 145 MCG capsule    LINZESS    30 capsule    Take 1 capsule (145 mcg) by mouth every morning (before breakfast)       lisinopril 40 MG tablet    PRINIVIL/ZESTRIL    30 tablet    Take 1 tablet (40 mg) by mouth daily Discontinue lisinopril 30 mg daily.  (dose increase)       naproxen 375 MG tablet    NAPROSYN    30 tablet    As needed for gout       omeprazole 20 MG CR capsule    priLOSEC    90 capsule    Take 1 capsule (20 mg) by mouth daily       rivaroxaban ANTICOAGULANT 20 MG Tabs tablet    XARELTO    30 tablet    Take 1 tablet (20 mg) by mouth daily (with dinner)       Simethicone 125 MG Caps     28 capsule    1 to 3 per day  Per gas pain       spironolactone 25 MG tablet    ALDACTONE    30 tablet    Take 1 tablet (25 mg) by mouth daily       * Notice:  This list has 9 medication(s) that are the same as other medications prescribed for you. Read the directions carefully, and ask your doctor or other care provider to review them with you.

## 2017-04-18 NOTE — PROGRESS NOTES
S: Eliana Bethea is a 77 year old male who returns for follow up of   Refill meds for gout, abdominal pain and constipation  Patients states that main concern today is  Refill meds.    PMHX/PSHX/MEDS/ALLERGIES/SHX/FHX reviewed and updated in Epic.      ROS:  General: No fevers, chills  Head: No headache  Ears: No acute change in hearing.    CV: No chest pain or palpitations.  Resp: No shortness of breath.  No cough. No hemoptysis.  GI: No nausea, vomiting, constipation, diarrhea  : No urinary pains    O: /60 (BP Location: Left arm, Patient Position: Chair)  Pulse 60  Temp 97.6  F (36.4  C) (Oral)   Gen:  Well nourished and in NAD  Neck: supple without lymphadenopathy  CV:  IRRR  - no murmurs, rubs, or gallups,   Pulm:  CTAB, no wheezes/rales/rhonchi, good air entry   ABD: soft,  no masses, no rebound, BS intact throughout  Extrem: no cyanosis, edema or clubbing  Psych: Euthymic      Diverticulosis   Gout    Constipation   Plan refill meds       RTC in 4weeks, for follow up of  Abdominal painor sooner if develops new or worsening symptoms.    Moshe Corley

## 2017-04-27 ENCOUNTER — TELEPHONE (OUTPATIENT)
Dept: FAMILY MEDICINE | Facility: CLINIC | Age: 77
End: 2017-04-27

## 2017-04-27 NOTE — TELEPHONE ENCOUNTER
Per [facility] pharmacy, indomethacin is not covered. Formulary alternatives: ibuprofen, naproxen, or meloxicam. Please send formulary alt if appropriate. Or do you want to fill out a PA?    Routed to Dr. Corley. /JAM Albarran

## 2017-05-01 DIAGNOSIS — R10.84 ABDOMINAL PAIN, GENERALIZED: ICD-10-CM

## 2017-05-01 RX ORDER — ACETAMINOPHEN 325 MG/1
TABLET ORAL
Qty: 60 TABLET | Refills: 1 | Status: SHIPPED | OUTPATIENT
Start: 2017-05-01 | End: 2017-01-01

## 2017-05-25 ENCOUNTER — OFFICE VISIT (OUTPATIENT)
Dept: FAMILY MEDICINE | Facility: CLINIC | Age: 77
End: 2017-05-25

## 2017-05-25 VITALS
BODY MASS INDEX: 33.07 KG/M2 | WEIGHT: 266 LBS | SYSTOLIC BLOOD PRESSURE: 122 MMHG | DIASTOLIC BLOOD PRESSURE: 77 MMHG | HEART RATE: 62 BPM | TEMPERATURE: 97.8 F | OXYGEN SATURATION: 97 % | HEIGHT: 75 IN

## 2017-05-25 DIAGNOSIS — R10.84 GENERALIZED ABDOMINAL PAIN: Primary | ICD-10-CM

## 2017-05-25 DIAGNOSIS — I10 ESSENTIAL HYPERTENSION: ICD-10-CM

## 2017-05-25 RX ORDER — HYDROCODONE BITARTRATE AND ACETAMINOPHEN 5; 325 MG/1; MG/1
TABLET ORAL
Qty: 59 TABLET | Refills: 0 | Status: SHIPPED | OUTPATIENT
Start: 2017-05-25 | End: 2017-07-20

## 2017-05-25 RX ORDER — HYDROCODONE BITARTRATE AND ACETAMINOPHEN 5; 325 MG/1; MG/1
TABLET ORAL
Qty: 59 TABLET | Refills: 0 | Status: SHIPPED | OUTPATIENT
Start: 2017-05-25 | End: 2017-05-25

## 2017-05-25 NOTE — MR AVS SNAPSHOT
After Visit Summary   2017    Eliana Bethea    MRN: [id number]           Patient Information     Date Of Birth          1940        Visit Information        Provider Department      2017 9:40 AM Moshe Corley MD [facility]        Today's Diagnoses     Generalized abdominal pain    -  1    Essential hypertension          Care Instructions    Blood pressure is in target   Abdominal pain fu with GI and urology   Fu in 4 to 6 weeks          Follow-ups after your visit        Follow-up notes from your care team     Return in about 4 weeks (around 2017).      Who to contact     Please call your clinic at [phone] to:    Ask questions about your health    Make or cancel appointments    Discuss your medicines    Learn about your test results    Speak to your doctor   If you have compliments or concerns about an experience at your clinic, or if you wish to file a complaint, please contact [facility] Patient Relations at [phone] or email us at [email or web address]         Additional Information About Your Visit        MyChart Information     [employer] is an electronic gateway that provides easy, online access to your medical records. With [employer], you can request a clinic appointment, read your test results, renew a prescription or communicate with your care team.     To sign up for [employer] visit the website at [email or web address]   You will be asked to enter the access code listed below, as well as some personal information. Please follow the directions to create your username and password.     Your access code is: [email or web address]  Expires: 2017  9:41 PM     Your access code will  in 90 days. If you need help or a new code, please contact your [facility] or call [phone] for assistance.        Care EveryWhere ID     This is your Care EveryWhere ID. This could be used by other organizations to  "access your [facility] medical records  [id number]        Your Vitals Were     Pulse Temperature Height Pulse Oximetry BMI (Body Mass Index)       62 97.8  F (36.6  C) 6' 3\" (190.5 cm) 97% 33.25 kg/m2        Blood Pressure from Last 3 Encounters:   05/25/17 122/77   04/18/17 100/60   03/21/17 157/82    Weight from Last 3 Encounters:   05/25/17 266 lb (120.7 kg)   03/21/17 272 lb 6.4 oz (123.6 kg)   02/22/17 265 lb 6.4 oz (120.4 kg)              Today, you had the following     No orders found for display         Today's Medication Changes          These changes are accurate as of: 5/25/17  9:57 AM.  If you have any questions, ask your nurse or doctor.               These medicines have changed or have updated prescriptions.        Dose/Directions    * HYDROcodone-acetaminophen 5-325 MG per tablet   Commonly known as:  NORCO   This may have changed:  Another medication with the same name was added. Make sure you understand how and when to take each.   Used for:  Diverticulosis of large intestine without hemorrhage   Changed by:  Moshe Corley MD        1 to 2 per day as needed for abdominal pain   Quantity:  41 tablet   Refills:  0       * HYDROcodone-acetaminophen 5-325 MG per tablet   Commonly known as:  NORCO   This may have changed:  Another medication with the same name was added. Make sure you understand how and when to take each.   Used for:  Diverticulosis of large intestine without hemorrhage   Changed by:  Moshe Corley MD        1-2 for abdominal pain as needed   Quantity:  31 tablet   Refills:  0       * HYDROcodone-acetaminophen 5-325 MG per tablet   Commonly known as:  NORCO   This may have changed:  Another medication with the same name was added. Make sure you understand how and when to take each.   Used for:  Diverticulosis of large intestine without hemorrhage   Changed by:  Moshe Corley MD        Once to twice a day  As needed  for abdominal pain/diverticulosis  maximum 2 tablet(s) per day "   Quantity:  31 tablet   Refills:  0       * HYDROcodone-acetaminophen 5-325 MG per tablet   Commonly known as:  NORCO   This may have changed:  Another medication with the same name was added. Make sure you understand how and when to take each.   Used for:  Generalized abdominal pain   Changed by:  Moshe Corley MD        1 to 2 per daymaximum 2 tablet(s) per day   Quantity:  31 tablet   Refills:  0       * HYDROcodone-acetaminophen 5-325 MG per tablet   Commonly known as:  NORCO   This may have changed:  Another medication with the same name was added. Make sure you understand how and when to take each.   Used for:  Abdominal pain, epigastric   Changed by:  Moshe Corley MD        1 to 2 maximum 2  tablet(s) per day   Quantity:  29 tablet   Refills:  0       * HYDROcodone-acetaminophen 5-325 MG per tablet   Commonly known as:  JEROME   This may have changed:  You were already taking a medication with the same name, and this prescription was added. Make sure you understand how and when to take each.   Used for:  Generalized abdominal pain, Essential hypertension   Changed by:  Moshe Corley MD        1 to 2 per days as needed for pain maximum 2 tablet(s) per day   Quantity:  59 tablet   Refills:  0       * Notice:  This list has 6 medication(s) that are the same as other medications prescribed for you. Read the directions carefully, and ask your doctor or other care provider to review them with you.         Where to get your medicines      Some of these will need a paper prescription and others can be bought over the counter.  Ask your nurse if you have questions.     Bring a paper prescription for each of these medications     HYDROcodone-acetaminophen 5-325 MG per tablet                Primary Care Provider Office Phone # Fax #    Moshe Corley [phone]       [address] [address]        Thank you!     Thank you for choosing [facility]  for your  care. Our goal is always to provide you with excellent care. Hearing back from our patients is one way we can continue to improve our services. Please take a few minutes to complete the written survey that you may receive in the mail after your visit with us. Thank you!             Your Updated Medication List - Protect others around you: Learn how to safely use, store and throw away your medicines at www.disposemymeds.org.          This list is accurate as of: 5/25/17  9:57 AM.  Always use your most recent med list.                   Brand Name Dispense Instructions for use    * acetaminophen 325 MG tablet    TYLENOL    60 tablet    Twice a day as needed for  abdominal pain       * acetaminophen 325 MG tablet    TYLENOL    60 tablet    650 mg bid as needed for  Pain NO MORE THAN 1300 mg per day       amLODIPine 2.5 MG tablet    NORVASC    30 tablet    Take 2 tablets (5 mg) by mouth daily       atorvastatin 20 MG tablet    LIPITOR    90 tablet    Take 1 tablet (20 mg) by mouth daily       carvedilol 12.5 MG tablet    COREG    180 tablet    Take 1 tablet (12.5 mg) by mouth 2 times daily (with meals)       furosemide 40 MG tablet    LASIX    90 tablet    Take 2 tablets (80 mg) by mouth daily       * guaiFENesin 100 MG/5ML Syrp    ROBITUSSIN CHEST CONGESTION    118 mL    Bid as needed for chest cold       * guaiFENesin 100 MG/5ML Liqd    ROBITUSSIN    118 mL    Take 10 mLs by mouth 2 times daily as needed for cough       * HYDROcodone-acetaminophen 5-325 MG per tablet    NORCO    41 tablet    1 to 2 per day as needed for abdominal pain       * HYDROcodone-acetaminophen 5-325 MG per tablet    NORCO    31 tablet    1-2 for abdominal pain as needed       * HYDROcodone-acetaminophen 5-325 MG per tablet    NORCO    31 tablet    Once to twice a day  As needed  for abdominal pain/diverticulosis  maximum 2 tablet(s) per day       * HYDROcodone-acetaminophen 5-325 MG per tablet    NORCO    31 tablet    1 to 2 per daymaximum 2  tablet(s) per day       * HYDROcodone-acetaminophen 5-325 MG per tablet    NORCO    29 tablet    1 to 2 maximum 2  tablet(s) per day       * HYDROcodone-acetaminophen 5-325 MG per tablet    NORCO    59 tablet    1 to 2 per days as needed for pain maximum 2 tablet(s) per day       indomethacin 75 MG CR capsule    INDOCIN SR    30 capsule    Take 1 capsule (75 mg) by mouth 2 times daily (with meals)       linaclotide 145 MCG capsule    LINZESS    30 capsule    Take 1 capsule (145 mcg) by mouth every morning (before breakfast)       lisinopril 40 MG tablet    PRINIVIL/ZESTRIL    30 tablet    Take 1 tablet (40 mg) by mouth daily Discontinue lisinopril 30 mg daily.  (dose increase)       naproxen 375 MG tablet    NAPROSYN    30 tablet    As needed for gout       omeprazole 20 MG CR capsule    priLOSEC    90 capsule    Take 1 capsule (20 mg) by mouth daily       rivaroxaban ANTICOAGULANT 20 MG Tabs tablet    XARELTO    30 tablet    Take 1 tablet (20 mg) by mouth daily (with dinner)       Simethicone 125 MG Caps     28 capsule    1 to 3 per day  Per gas pain       spironolactone 25 MG tablet    ALDACTONE    30 tablet    Take 1 tablet (25 mg) by mouth daily       * Notice:  This list has 10 medication(s) that are the same as other medications prescribed for you. Read the directions carefully, and ask your doctor or other care provider to review them with you.

## 2017-05-25 NOTE — PROGRESS NOTES
"S: Eliana Bethea is a 77 year old male who returns for follow up of  Abdominal pain.   Patients states that main concern today is BP recheck and abdominal pain due to diverticulosis, will see GI to Fu    PMHX/PSHX/MEDS/ALLERGIES/SHX/FHX reviewed and updated in Epic.      ROS:  General: No fevers, chills  Head: No headache  Ears: No acute change in hearing.    CV: No chest pain or palpitations.  Resp: No shortness of breath.  No cough. No hemoptysis.  GI: No nausea, vomiting, constipation, diarrhea  : No urinary pains    O: /77  Pulse 62  Temp 97.8  F (36.6  C)  Ht 6' 3\" (190.5 cm)  Wt 266 lb (120.7 kg)  SpO2 97%  BMI 33.25 kg/m2   Gen:  Well nourished and in NAD    CV: I RRR  - no murmurs, rubs, or gallups,   Pulm:  CTAB, no wheezes/rales/rhonchi, good air entry   ABD: soft, nontender, no masses, no rebound, BS intact throughout  Extrem: no cyanosis, edema or clubbing  Psych: Euthymic      1 Hypertension; in target no change in meds   2 Abdominal pain/ diverticulosis, colonoscopy approximally 5 years ago,  Fu with GI  and urology probably urinary retention   Ok for few Vicodin    Total of 25 minutes was spent in face to face contact with patient with > 50% in counseling and coordination of care.  Options for treatment and/or follow-up care were reviewed with the patient. Eliana Bethea was engaged and actively involved in the decision making process. He verbalized understanding of the options discussed and was satisfied with the final plan.    ROTC in weeks, for follow up of medical care or sooner if develops new or worsening symptoms.    Moshe Corley"

## 2017-06-09 ENCOUNTER — OFFICE VISIT - HEALTHEAST (OUTPATIENT)
Dept: CARDIOLOGY | Facility: CLINIC | Age: 77
End: 2017-06-09

## 2017-06-09 ENCOUNTER — AMBULATORY - HEALTHEAST (OUTPATIENT)
Dept: CARDIOLOGY | Facility: CLINIC | Age: 77
End: 2017-06-09

## 2017-06-09 ENCOUNTER — TRANSFERRED RECORDS (OUTPATIENT)
Dept: HEALTH INFORMATION MANAGEMENT | Facility: CLINIC | Age: 77
End: 2017-06-09

## 2017-06-09 DIAGNOSIS — I10 ESSENTIAL HYPERTENSION WITH GOAL BLOOD PRESSURE LESS THAN 130/80: ICD-10-CM

## 2017-06-09 DIAGNOSIS — M62.89 EXERCISE-INDUCED LEG FATIGUE: ICD-10-CM

## 2017-06-09 DIAGNOSIS — I25.10 CORONARY ARTERY DISEASE INVOLVING NATIVE CORONARY ARTERY OF NATIVE HEART WITHOUT ANGINA PECTORIS: ICD-10-CM

## 2017-06-09 DIAGNOSIS — Z95.0 CARDIAC PACEMAKER IN SITU: ICD-10-CM

## 2017-06-09 DIAGNOSIS — I48.20 CHRONIC ATRIAL FIBRILLATION (H): ICD-10-CM

## 2017-06-09 LAB — HCC DEVICE COMMENTS: NORMAL

## 2017-06-09 ASSESSMENT — MIFFLIN-ST. JEOR: SCORE: 1955.45

## 2017-06-28 ENCOUNTER — TELEPHONE (OUTPATIENT)
Dept: FAMILY MEDICINE | Facility: CLINIC | Age: 77
End: 2017-06-28

## 2017-06-28 DIAGNOSIS — I10 ESSENTIAL HYPERTENSION WITH GOAL BLOOD PRESSURE LESS THAN 140/90: ICD-10-CM

## 2017-06-28 DIAGNOSIS — K21.00 GASTROESOPHAGEAL REFLUX DISEASE WITH ESOPHAGITIS: ICD-10-CM

## 2017-06-28 DIAGNOSIS — E78.00 PURE HYPERCHOLESTEROLEMIA: ICD-10-CM

## 2017-06-28 DIAGNOSIS — I10 BENIGN ESSENTIAL HYPERTENSION: ICD-10-CM

## 2017-06-28 DIAGNOSIS — K57.30 DIVERTICULOSIS OF LARGE INTESTINE WITHOUT HEMORRHAGE: ICD-10-CM

## 2017-06-28 DIAGNOSIS — I48.20 CHRONIC ATRIAL FIBRILLATION (H): ICD-10-CM

## 2017-06-28 RX ORDER — AMLODIPINE BESYLATE 2.5 MG/1
5 TABLET ORAL DAILY
Qty: 60 TABLET | Refills: 11 | Status: CANCELLED | OUTPATIENT
Start: 2017-06-28

## 2017-06-28 RX ORDER — ATORVASTATIN CALCIUM 20 MG/1
20 TABLET, FILM COATED ORAL DAILY
Qty: 90 TABLET | Refills: 0 | Status: SHIPPED | OUTPATIENT
Start: 2017-06-28 | End: 2017-01-01

## 2017-06-28 RX ORDER — SIMETHICONE 125 MG
CAPSULE ORAL
Qty: 28 CAPSULE | Refills: 1 | Status: SHIPPED | OUTPATIENT
Start: 2017-06-28 | End: 2018-01-01

## 2017-06-28 RX ORDER — AMLODIPINE BESYLATE 2.5 MG/1
TABLET ORAL
Qty: 60 TABLET | Refills: 11 | Status: SHIPPED | OUTPATIENT
Start: 2017-06-28 | End: 2018-01-01

## 2017-06-28 RX ORDER — LISINOPRIL 40 MG/1
40 TABLET ORAL DAILY
Qty: 30 TABLET | Refills: 11 | Status: SHIPPED | OUTPATIENT
Start: 2017-06-28 | End: 2018-01-01

## 2017-06-28 NOTE — TELEPHONE ENCOUNTER
Patient states he take 2 amlodipine tabs daily but only qty 30 was sent and he would like the provider to send a new rx to his pharmacy with a qty of 60. Please advise. /JAM Chandra  Routed to Dr. Corley

## 2017-06-28 NOTE — TELEPHONE ENCOUNTER
[facility] phone call message- general phone call:    Reason for call: Pt would like to speak with the triage nurse.  His cardiologist changed his medications/dosages.  He would like to discuss this.    Return call needed: Yes    OK to leave a message on voice mail? Yes    Primary language: English      needed? No    Call taken on June 28, 2017 at 10:04 AM by Rai Pate

## 2017-06-30 ENCOUNTER — TRANSFERRED RECORDS (OUTPATIENT)
Dept: HEALTH INFORMATION MANAGEMENT | Facility: CLINIC | Age: 77
End: 2017-06-30

## 2017-07-13 ENCOUNTER — AMBULATORY - HEALTHEAST (OUTPATIENT)
Dept: CARDIOLOGY | Facility: CLINIC | Age: 77
End: 2017-07-13

## 2017-07-13 DIAGNOSIS — Z95.0 CARDIAC PACEMAKER IN SITU: ICD-10-CM

## 2017-07-13 LAB — HCC DEVICE COMMENTS: NORMAL

## 2017-07-13 ASSESSMENT — MIFFLIN-ST. JEOR: SCORE: 1932.77

## 2017-07-17 ENCOUNTER — COMMUNICATION - HEALTHEAST (OUTPATIENT)
Dept: CARDIOLOGY | Facility: CLINIC | Age: 77
End: 2017-07-17

## 2017-07-20 ENCOUNTER — OFFICE VISIT (OUTPATIENT)
Dept: FAMILY MEDICINE | Facility: CLINIC | Age: 77
End: 2017-07-20

## 2017-07-20 VITALS
HEART RATE: 60 BPM | BODY MASS INDEX: 31.67 KG/M2 | SYSTOLIC BLOOD PRESSURE: 118 MMHG | OXYGEN SATURATION: 100 % | TEMPERATURE: 97.9 F | DIASTOLIC BLOOD PRESSURE: 65 MMHG | WEIGHT: 253.4 LBS

## 2017-07-20 DIAGNOSIS — K57.30 DIVERTICULOSIS OF LARGE INTESTINE WITHOUT HEMORRHAGE: Primary | ICD-10-CM

## 2017-07-20 DIAGNOSIS — R10.84 GENERALIZED ABDOMINAL PAIN: ICD-10-CM

## 2017-07-20 RX ORDER — HYDROCODONE BITARTRATE AND ACETAMINOPHEN 5; 325 MG/1; MG/1
TABLET ORAL
Qty: 59 TABLET | Refills: 0 | Status: SHIPPED | OUTPATIENT
Start: 2017-07-20 | End: 2017-01-01

## 2017-07-20 NOTE — MR AVS SNAPSHOT
After Visit Summary   2017    Eliana Bethea    MRN: [id number]           Patient Information     Date Of Birth          1940        Visit Information        Provider Department      2017 9:40 AM Cheikh Hayes MD [facility]        Today's Diagnoses     Diverticulosis of large intestine without hemorrhage    -  1    Generalized abdominal pain           Follow-ups after your visit        Follow-up notes from your care team     Return in about 4 weeks (around 2017).      Who to contact     Please call your clinic at [phone] to:    Ask questions about your health    Make or cancel appointments    Discuss your medicines    Learn about your test results    Speak to your doctor   If you have compliments or concerns about an experience at your clinic, or if you wish to file a complaint, please contact [facility] Patient Relations at [phone] or email us at [email or web address]         Additional Information About Your Visit        MyChart Information     [employer] is an electronic gateway that provides easy, online access to your medical records. With [employer], you can request a clinic appointment, read your test results, renew a prescription or communicate with your care team.     To sign up for [employer] visit the website at [email or web address]   You will be asked to enter the access code listed below, as well as some personal information. Please follow the directions to create your username and password.     Your access code is: [email or web address]  Expires: 10/18/2017 10:26 AM     Your access code will  in 90 days. If you need help or a new code, please contact your [facility] or call [phone] for assistance.        Care EveryWhere ID     This is your Care EveryWhere ID. This could be used by other organizations to access your [place] medical records  [id number]        Your Vitals Were     Pulse  Temperature Pulse Oximetry BMI (Body Mass Index)          60 97.9  F (36.6  C) (Oral) 100% 31.67 kg/m2         Blood Pressure from Last 3 Encounters:   07/20/17 118/65   05/25/17 122/77   04/18/17 100/60    Weight from Last 3 Encounters:   07/20/17 253 lb 6.4 oz (114.9 kg)   05/25/17 266 lb (120.7 kg)   03/21/17 272 lb 6.4 oz (123.6 kg)              Today, you had the following     No orders found for display         Today's Medication Changes          These changes are accurate as of: 7/20/17 10:26 AM.  If you have any questions, ask your nurse or doctor.               These medicines have changed or have updated prescriptions.        Dose/Directions    acetaminophen 325 MG tablet   Commonly known as:  TYLENOL   This may have changed:  Another medication with the same name was removed. Continue taking this medication, and follow the directions you see here.   Used for:  Abdominal pain, generalized   Changed by:  Moshe Corley MD        650 mg bid as needed for  Pain NO MORE THAN 1300 mg per day   Quantity:  60 tablet   Refills:  1       HYDROcodone-acetaminophen 5-325 MG per tablet   Commonly known as:  NORCO   This may have changed:  Another medication with the same name was removed. Continue taking this medication, and follow the directions you see here.   Used for:  Generalized abdominal pain   Changed by:  Cheikh Hayes MD        1 to 2 per day maximum 2tablet(s) per day   Quantity:  59 tablet   Refills:  0         Stop taking these medicines if you haven't already. Please contact your care team if you have questions.     guaiFENesin 100 MG/5ML Liqd   Commonly known as:  ROBITUSSIN   Stopped by:  Cheikh Hayes MD           guaiFENesin 100 MG/5ML Syrp   Commonly known as:  ROBITUSSIN CHEST CONGESTION   Stopped by:  Cheikh Hayes MD                Where to get your medicines      Some of these will need a paper prescription and others can be bought over the counter.  Ask your nurse if you have  questions.     Bring a paper prescription for each of these medications     HYDROcodone-acetaminophen 5-325 MG per tablet                Primary Care Provider Office Phone # Fax #    Moshe Corley [phone]       [address] [address]        Equal Access to Services     GAMALIEL EVANS : J Luis vidal hadasho Soomaali, waaxda luqadaha, qaybta kaalmada adeegyada, waxcitlalli andrews. So [facility] [phone].    ATENCIÓN: Si habla español, tiene a flanagan disposición servicios gratuitos de asistencia lingüística. Llame al [phone].    We comply with applicable federal civil rights laws and [place] laws. We do not discriminate on the basis of race, color, national origin, age, disability sex, sexual orientation or gender identity.            Thank you!     Thank you for choosing [facility]  for your care. Our goal is always to provide you with excellent care. Hearing back from our patients is one way we can continue to improve our services. Please take a few minutes to complete the written survey that you may receive in the mail after your visit with us. Thank you!             Your Updated Medication List - Protect others around you: Learn how to safely use, store and throw away your medicines at www.disposemymeds.org.          This list is accurate as of: 7/20/17 10:26 AM.  Always use your most recent med list.                   Brand Name Dispense Instructions for use Diagnosis    acetaminophen 325 MG tablet    TYLENOL    60 tablet    650 mg bid as needed for  Pain NO MORE THAN 1300 mg per day    Abdominal pain, generalized       amLODIPine 2.5 MG tablet    NORVASC    60 tablet    One tab Twice a day    Essential hypertension with goal blood pressure less than 140/90       atorvastatin 20 MG tablet    LIPITOR    90 tablet    Take 1 tablet (20 mg) by mouth daily    Pure hypercholesterolemia       carvedilol 12.5 MG tablet    COREG    180 tablet     Take 1 tablet (12.5 mg) by mouth 2 times daily (with meals)    Chronic atrial fibrillation (H)       furosemide 40 MG tablet    LASIX    90 tablet    Take 2 tablets (80 mg) by mouth daily    Pedal edema       HYDROcodone-acetaminophen 5-325 MG per tablet    NORCO    59 tablet    1 to 2 per day maximum 2tablet(s) per day    Generalized abdominal pain       indomethacin 75 MG CR capsule    INDOCIN SR    30 capsule    Take 1 capsule (75 mg) by mouth 2 times daily (with meals)    Acute idiopathic gout, unspecified site       linaclotide 145 MCG capsule    LINZESS    30 capsule    Take 1 capsule (145 mcg) by mouth every morning (before breakfast)    Slow transit constipation       lisinopril 40 MG tablet    PRINIVIL/ZESTRIL    30 tablet    Take 1 tablet (40 mg) by mouth daily Discontinue lisinopril 30 mg daily.  (dose increase)    Essential hypertension with goal blood pressure less than 140/90       naproxen 375 MG tablet    NAPROSYN    30 tablet    As needed for gout    Acute idiopathic gout of right foot       omeprazole 20 MG CR capsule    priLOSEC    90 capsule    Take 1 capsule (20 mg) by mouth daily    Gastroesophageal reflux disease with esophagitis       rivaroxaban ANTICOAGULANT 20 MG Tabs tablet    XARELTO    30 tablet    Take 1 tablet (20 mg) by mouth daily (with dinner)    Chronic atrial fibrillation (H)       Simethicone 125 MG Caps     28 capsule    1 to 3 per day  Per gas pain    Diverticulosis of large intestine without hemorrhage       spironolactone 25 MG tablet    ALDACTONE    30 tablet    Take 1 tablet (25 mg) by mouth daily    Benign essential hypertension

## 2017-07-20 NOTE — PROGRESS NOTES
SUBJECTIVE       Eliana Bethea is a 77 year old  male with a PMH significant for:     Patient Active Problem List   Diagnosis     Aphakia     Aneurysm of abdominal aorta (H)     Benign hypertension     Cardiac pacemaker  present     Health Care Home     Gout, chronic     Complete colonoscopy     Depressive disorder, not elsewhere classified      knee replacement     Male erectile disorder     History of shoulder replacement     Chronic atrial fibrillation (H)     Anticoagulation adequate with anticoagulant therapy     S/P carotid endarterectomy     Carpal tunnel syndrome on right     Single vessel coronary artery disease     Anemia     S/P hernia repair     Liver cell damage     Alcohol dependence (H)     Chronic systolic congestive heart failure (H)     Diverticulosis of large intestine     He presents with recurrent abdominal pain from diverticulosis.  He takes vicodin on a prn basis for this, usually once a day to once every other day.  His primary physician, Dr. Corley has had him on this regimen for several years.  Pt will be leaving for a month on a trip and would like a refill to take with him.  He has no red flag symptoms and is at low risk for abusing the medicine from history and current risk status.    PMH, Medications and Allergies were reviewed and updated as needed.        REVIEW OF SYSTEMS     CONSTITUTIONAL: NEGATIVE for fever, chills, change in weight  INTEGUMENTARY/SKIN: NEGATIVE for worrisome rashes, moles or lesions  RESP: NEGATIVE for significant cough or SOB  CV: NEGATIVE for chest pain, palpitations or peripheral edema  GI: NEGATIVE for nausea, abdominal pain, heartburn, or change in bowel habits  MUSCULOSKELETAL:osteoarthritis in knees and hands  NEURO: NEGATIVE for weakness, dizziness or paresthesias  ENDOCRINE: NEGATIVE for temperature intolerance, skin/hair changes  HEME/ALLERGY: NEGATIVE for bleeding problems  PSYCHIATRIC: NEGATIVE for changes in mood or affect        OBJECTIVE      Vitals:    07/20/17 0947   BP: 118/65   Pulse: 60   Temp: 97.9  F (36.6  C)   TempSrc: Oral   SpO2: 100%   Weight: 253 lb 6.4 oz (114.9 kg)     Body mass index is 31.67 kg/(m^2).    Constitutional: Awake, alert, cooperative, no apparent distress, and appears stated age.  Lungs: No increased work of breathing, good air exchange, clear to auscultation bilaterally, no crackles or wheezing.  Cardiovascular: Regular rate and rhythm, normal S1 and S2, no S3 or S4, and no murmur noted.  Abdomen: No scars, normal bowel sounds, soft, non-distended, non-tender, no masses palpated, no hepatosplenomegally.  Musculoskeletal: degenerative changes of hands, wrists, and knees, with crepitus upon ROM  Neurologic: Awake, alert, oriented to name, place and time.  Cranial nerves II-XII are grossly intact.  Motor is 5 out of 5 bilaterally.  Cerebellar finger to nose, heel to shin intact.  Sensory is intact.  Babinski down going, Romberg negative, and gait is normal.  Neuropsychiatric: Normal affect, mood, orientation, memory and insight.  Skin: No rashes, erythema, pallor, petechia or purpura.    No results found for this or any previous visit (from the past 24 hour(s)).        ASSESSMENT AND PLAN     1. Generalized abdominal pain- diverticulosis  Stable regimen per primary doc, will refill and have patient f/u with [facility] in one month  - HYDROcodone-acetaminophen (NORCO) 5-325 MG per tablet; 1 to 2 per day maximum 2tablet(s) per day  Dispense: 59 tablet; Refill: 0        RTC in one month for follow up of diverticulosis or sooner if develops new or worsening symptoms.    Cheikh aHyes

## 2017-07-21 ASSESSMENT — PATIENT HEALTH QUESTIONNAIRE - PHQ9: SUM OF ALL RESPONSES TO PHQ QUESTIONS 1-9: 0

## 2017-08-01 ENCOUNTER — TELEPHONE (OUTPATIENT)
Dept: FAMILY MEDICINE | Facility: CLINIC | Age: 77
End: 2017-08-01

## 2017-08-01 NOTE — TELEPHONE ENCOUNTER
Patient is requesting SILVER SULFADIAZINE 1% CREAM 85GM.  Medication is not on patient's list.  Please advise.

## 2017-08-02 ENCOUNTER — OFFICE VISIT (OUTPATIENT)
Dept: FAMILY MEDICINE | Facility: CLINIC | Age: 77
End: 2017-08-02

## 2017-08-02 VITALS
TEMPERATURE: 98.6 F | OXYGEN SATURATION: 97 % | WEIGHT: 253.13 LBS | SYSTOLIC BLOOD PRESSURE: 114 MMHG | HEART RATE: 60 BPM | DIASTOLIC BLOOD PRESSURE: 67 MMHG | BODY MASS INDEX: 31.64 KG/M2

## 2017-08-02 DIAGNOSIS — K57.30 DIVERTICULOSIS OF LARGE INTESTINE WITHOUT HEMORRHAGE: Primary | ICD-10-CM

## 2017-08-02 NOTE — NURSING NOTE
May we mail normal lab results to your home? Prefers a Call    Patient's preferred contact method if labs are abnormal  Phone call to [phone], ok to leave message      Declined PHQ-9

## 2017-08-02 NOTE — MR AVS SNAPSHOT
After Visit Summary   8/2/2017    Eliana Bethea    MRN: [id number]           Patient Information     Date Of Birth          1940        Visit Information        Provider Department      8/2/2017 9:20 AM Moshe Corley MD [facility]        Care Instructions    Contact your Gastroenterologist to set up an appointment for a consultation to discuss your concerns and follow testing recommendations.     Information from our Up to Date Resource given regarding Malabsorption to discuss with your GI Specialist due to your symptoms.    Keep a detailed log of your diet and your symptoms of bloating, pain and headaches. Take this with you to your appointment to share with your GI Specialist.     Your last Thyroid lab was within normal range.    Thank you for coming to [facility]..   **If the lab tests need quick action we will call you with the results.  The phone number we will call with results is # [phone] (home) . If this is not the best number please call our clinic and change the number.  If you need any refills please call your pharmacy and they will contact us.  If you have any concerns about today's visit or wish to schedule another appointment please call our office during normal business hours [phone] (8-5:00 M-F)  If you have urgent medical concerns please call [phone] at any time of the day.  If you a medical emergency please call [phone]  Again thank you for choosing [facility] and please let us know how we can best partner with you to improve you and your family's health.              Follow-ups after your visit        Who to contact     Please call your clinic at [phone] to:    Ask questions about your health    Make or cancel appointments    Discuss your medicines    Learn about your test results    Speak to your doctor   If you have compliments or concerns about an experience at your clinic, or if you wish to file a complaint, please contact [facility]  [place] [facility] Patient Relations at [phone] or email us at [email or web address]         Additional Information About Your Visit        [employer] Information     Hyglos is an electronic gateway that provides easy, online access to your medical records. With Hyglos, you can request a clinic appointment, read your test results, renew a prescription or communicate with your care team.     To sign up for Hyglos visit the website at [email or web address]   You will be asked to enter the access code listed below, as well as some personal information. Please follow the directions to create your username and password.     Your access code is: [email or web address]  Expires: 10/18/2017 10:26 AM     Your access code will  in 90 days. If you need help or a new code, please contact your [facility] or call [phone] for assistance.        Care EveryWhere ID     This is your Care EveryWhere ID. This could be used by other organizations to access your [place] medical records  [id number]        Your Vitals Were     Pulse Temperature Pulse Oximetry BMI (Body Mass Index)          60 98.6  F (37  C) (Oral) 97% 31.64 kg/m2         Blood Pressure from Last 3 Encounters:   17 114/67   17 118/65   17 122/77    Weight from Last 3 Encounters:   17 253 lb 2 oz (114.8 kg)   17 253 lb 6.4 oz (114.9 kg)   17 266 lb (120.7 kg)              Today, you had the following     No orders found for display       Primary Care Provider Office Phone # Fax #    Moshe Corley [phone]       [address] [address]        Equal Access to Services     GAMALIEL EVANS : J Luis Mcallister, enma cruz, dale ricketts. So [facility] [phone].    ATENCIÓN: Si habla español, tiene a flanagan disposición servicios gratuitos de asistencia lingüística. Llame  al [phone].    We comply with applicable federal civil rights laws and [place] laws. We do not discriminate on the basis of race, color, national origin, age, disability sex, sexual orientation or gender identity.            Thank you!     Thank you for choosing [facility]  for your care. Our goal is always to provide you with excellent care. Hearing back from our patients is one way we can continue to improve our services. Please take a few minutes to complete the written survey that you may receive in the mail after your visit with us. Thank you!             Your Updated Medication List - Protect others around you: Learn how to safely use, store and throw away your medicines at www.disposemymeds.org.          This list is accurate as of: 8/2/17  9:39 AM.  Always use your most recent med list.                   Brand Name Dispense Instructions for use Diagnosis    acetaminophen 325 MG tablet    TYLENOL    60 tablet    650 mg bid as needed for  Pain NO MORE THAN 1300 mg per day    Abdominal pain, generalized       amLODIPine 2.5 MG tablet    NORVASC    60 tablet    One tab Twice a day    Essential hypertension with goal blood pressure less than 140/90       atorvastatin 20 MG tablet    LIPITOR    90 tablet    Take 1 tablet (20 mg) by mouth daily    Pure hypercholesterolemia       B-12 PO           carvedilol 12.5 MG tablet    COREG    180 tablet    Take 1 tablet (12.5 mg) by mouth 2 times daily (with meals)    Chronic atrial fibrillation (H)       furosemide 40 MG tablet    LASIX    90 tablet    Take 2 tablets (80 mg) by mouth daily    Pedal edema       HYDROcodone-acetaminophen 5-325 MG per tablet    NORCO    59 tablet    1 to 2 per day maximum 2tablet(s) per day    Generalized abdominal pain       indomethacin 75 MG CR capsule    INDOCIN SR    30 capsule    Take 1 capsule (75 mg) by mouth 2 times daily (with meals)    Acute idiopathic gout, unspecified site       linaclotide 145 MCG capsule    LINZESS     30 capsule    Take 1 capsule (145 mcg) by mouth every morning (before breakfast)    Slow transit constipation       lisinopril 40 MG tablet    PRINIVIL/ZESTRIL    30 tablet    Take 1 tablet (40 mg) by mouth daily Discontinue lisinopril 30 mg daily.  (dose increase)    Essential hypertension with goal blood pressure less than 140/90       MULTIVITAMIN ADULT PO           naproxen 375 MG tablet    NAPROSYN    30 tablet    As needed for gout    Acute idiopathic gout of right foot       omeprazole 20 MG CR capsule    priLOSEC    90 capsule    Take 1 capsule (20 mg) by mouth daily    Gastroesophageal reflux disease with esophagitis       rivaroxaban ANTICOAGULANT 20 MG Tabs tablet    XARELTO    30 tablet    Take 1 tablet (20 mg) by mouth daily (with dinner)    Chronic atrial fibrillation (H)       Simethicone 125 MG Caps     28 capsule    1 to 3 per day  Per gas pain    Diverticulosis of large intestine without hemorrhage       spironolactone 25 MG tablet    ALDACTONE    30 tablet    Take 1 tablet (25 mg) by mouth daily    Benign essential hypertension

## 2017-08-02 NOTE — PATIENT INSTRUCTIONS
Contact your Gastroenterologist to set up an appointment for a consultation to discuss your concerns and follow testing recommendations.     Information from our Up to Date Resource given regarding Malabsorption to discuss with your GI Specialist due to your symptoms.    Keep a detailed log of your diet and your symptoms of bloating, pain and headaches. Take this with you to your appointment to share with your GI Specialist.     Your last Thyroid lab was within normal range.    Thank you for coming to [facility]..   **If the lab tests need quick action we will call you with the results.  The phone number we will call with results is # [phone] (home) . If this is not the best number please call our clinic and change the number.  If you need any refills please call your pharmacy and they will contact us.  If you have any concerns about today's visit or wish to schedule another appointment please call our office during normal business hours [phone] (8-5:00 M-F)  If you have urgent medical concerns please call [phone] at any time of the day.  If you a medical emergency please call [phone]  Again thank you for choosing [facility] and please let us know how we can best partner with you to improve you and your family's health.

## 2017-08-03 NOTE — PROGRESS NOTES
S: Eliana Bethea is a 77 year old male who returns for follow up of mostly gastrointestinal problems, especially bloating after meals, feeling of being full and feeling that he has digestive problems. He perceives that this is a major problem because he become nauseated and experiences headaches. He knows that he has extensive workup of upper GI and lower GI endoscopy as well as CAT scan   Patients states that main concern today is bloating and a lot of GI questions. His abdominal pain is better.     PMHX/PSHX/MEDS/ALLERGIES/SHX/FHX reviewed and updated in Epic.      ROS:  General: No fevers, chills  Head: No headache  Ears: No acute change in hearing.    CV: No chest pain or palpitations.  Resp: No shortness of breath.  No cough. No hemoptysis.  GI: No nausea, vomiting, constipation, diarrhea  : No urinary pains    O: /67  Pulse 60  Temp 98.6  F (37  C) (Oral)  Wt 253 lb 2 oz (114.8 kg)  SpO2 97%  BMI 31.64 kg/m2   Gen:  Well nourished and in NAD  CV:  IRRR  - no murmurs, rubs, or gallups,   Pulm:  CTAB, no wheezes/rales/rhonchi, good air entry   ABD: soft, nontender, no masses, no rebound, BS intact throughout, distended,   Extrem: no cyanosis, edema or clubbing  Psych: Euthymic      Abdominal pain is better and is taking a few narcotic pain. He is known to have diverticulosis, however appears to have some symptomatology of malabsorption. I have asked him to discuss decisions with the GI doctor. He is on laxatives and has increased his fiber for his constipation. I will follow him up after GI appointment    RTC for coordination of abdominal care/after GI follow up or sooner if develops new or worsening symptoms.    Moshe Corley

## 2017-08-10 DIAGNOSIS — L98.9 SKIN ABNORMALITY: Primary | ICD-10-CM

## 2017-08-14 ENCOUNTER — TRANSFERRED RECORDS (OUTPATIENT)
Dept: HEALTH INFORMATION MANAGEMENT | Facility: CLINIC | Age: 77
End: 2017-08-14

## 2017-08-14 RX ORDER — SILVER SULFADIAZINE 10 MG/G
CREAM TOPICAL 2 TIMES DAILY
Qty: 85 G | Refills: 1 | Status: SHIPPED | OUTPATIENT
Start: 2017-08-14 | End: 2017-08-21

## 2017-08-15 ENCOUNTER — AMBULATORY - HEALTHEAST (OUTPATIENT)
Dept: CARDIOLOGY | Facility: CLINIC | Age: 77
End: 2017-08-15

## 2017-08-17 ENCOUNTER — RECORDS - HEALTHEAST (OUTPATIENT)
Dept: ADMINISTRATIVE | Facility: OTHER | Age: 77
End: 2017-08-17

## 2017-08-24 ENCOUNTER — AMBULATORY - HEALTHEAST (OUTPATIENT)
Dept: CARDIOLOGY | Facility: CLINIC | Age: 77
End: 2017-08-24

## 2017-08-25 ENCOUNTER — AMBULATORY - HEALTHEAST (OUTPATIENT)
Dept: CARDIOLOGY | Facility: CLINIC | Age: 77
End: 2017-08-25

## 2017-08-28 ENCOUNTER — COMMUNICATION - HEALTHEAST (OUTPATIENT)
Dept: ONCOLOGY | Facility: HOSPITAL | Age: 77
End: 2017-08-28

## 2017-08-29 ENCOUNTER — TRANSFERRED RECORDS (OUTPATIENT)
Dept: HEALTH INFORMATION MANAGEMENT | Facility: CLINIC | Age: 77
End: 2017-08-29

## 2017-08-29 ENCOUNTER — HOSPITAL ENCOUNTER (OUTPATIENT)
Dept: ULTRASOUND IMAGING | Facility: CLINIC | Age: 77
Discharge: HOME | End: 2017-08-29

## 2017-08-29 ENCOUNTER — AMBULATORY - HEALTHEAST (OUTPATIENT)
Dept: CARDIOLOGY | Facility: CLINIC | Age: 77
End: 2017-08-29

## 2017-08-29 DIAGNOSIS — R10.13 EPIGASTRIC PAIN: ICD-10-CM

## 2017-08-29 DIAGNOSIS — Z95.0 CARDIAC PACEMAKER IN SITU: ICD-10-CM

## 2017-08-29 LAB — HCC DEVICE COMMENTS: NORMAL

## 2017-08-29 ASSESSMENT — MIFFLIN-ST. JEOR: SCORE: 1937.3

## 2017-08-30 ENCOUNTER — RECORDS - HEALTHEAST (OUTPATIENT)
Dept: ADMINISTRATIVE | Facility: OTHER | Age: 77
End: 2017-08-30

## 2017-08-30 DIAGNOSIS — R60.0 PEDAL EDEMA: ICD-10-CM

## 2017-08-30 RX ORDER — FUROSEMIDE 40 MG
80 TABLET ORAL DAILY
Qty: 90 TABLET | Refills: 0 | Status: SHIPPED | OUTPATIENT
Start: 2017-08-30 | End: 2017-01-01

## 2017-09-11 PROBLEM — R77.8 ABNORMAL SPEP: Status: ACTIVE | Noted: 2017-09-11

## 2017-09-14 ENCOUNTER — OFFICE VISIT (OUTPATIENT)
Dept: FAMILY MEDICINE | Facility: CLINIC | Age: 77
End: 2017-09-14

## 2017-09-14 VITALS
TEMPERATURE: 97.7 F | HEART RATE: 60 BPM | WEIGHT: 256.2 LBS | BODY MASS INDEX: 32.02 KG/M2 | DIASTOLIC BLOOD PRESSURE: 73 MMHG | SYSTOLIC BLOOD PRESSURE: 139 MMHG

## 2017-09-14 DIAGNOSIS — R10.30 LOWER ABDOMINAL PAIN: Primary | ICD-10-CM

## 2017-09-14 RX ORDER — HYDROCODONE BITARTRATE AND ACETAMINOPHEN 5; 325 MG/1; MG/1
TABLET ORAL
Qty: 60 TABLET | Refills: 0 | Status: SHIPPED | OUTPATIENT
Start: 2017-09-14 | End: 2018-01-01

## 2017-09-14 RX ORDER — HYDROCODONE BITARTRATE AND ACETAMINOPHEN 7.5; 325 MG/15ML; MG/15ML
15 SOLUTION ORAL EVERY 4 HOURS PRN
Qty: 270 ML | Refills: 0 | Status: SHIPPED | OUTPATIENT
Start: 2017-09-14 | End: 2017-09-14

## 2017-09-14 NOTE — PROGRESS NOTES
S: Eliana Bethea is a 77 year old male who returns for follow up of  Abdominal pain    Needs refills      PMHX/PSHX/MEDS/ALLERGIES/SHX/FHX reviewed and updated in Epic.      ROS:  General: No fevers, chills  Head: No headache  Ears: No acute change in hearing.    CV: No chest pain or palpitations.  Resp: No shortness of breath.  No cough. No hemoptysis.  GI: No nausea, vomiting, constipation, diarrhea  : No urinary pains    O: /73  Pulse 60  Temp 97.7  F (36.5  C) (Oral)  Wt 256 lb 3.2 oz (116.2 kg)  BMI 32.02 kg/m2   Gen:  Well nourished and in NAD    Neck: supple without lymphadenopathy  CV:  RRR  - no murmurs, rubs, or gallups,   Pulm:  CTAB, no wheezes/rales/rhonchi, good air entry   ABD: soft, nontender, no masses, no rebound, BS intact throughout  Extrem: no cyanosis, edema or clubbing  Psych: Euthymic      Abdominal pain/diverticulosis  Had electro/ positive for high igA   -US of mesenteric/unconclusive  Fu hematology  - Will need CTA angio mesenterics   FU GI    OK for few Vicodin      RTC in 1weeks, for follow up of coordination  or sooner if develops new or worsening symptoms.    Moshe Corley

## 2017-09-19 ENCOUNTER — COMMUNICATION - HEALTHEAST (OUTPATIENT)
Dept: ONCOLOGY | Facility: HOSPITAL | Age: 77
End: 2017-09-19

## 2017-09-21 ENCOUNTER — MEDICAL CORRESPONDENCE (OUTPATIENT)
Dept: HEALTH INFORMATION MANAGEMENT | Facility: CLINIC | Age: 77
End: 2017-09-21

## 2017-09-21 ENCOUNTER — AMBULATORY - HEALTHEAST (OUTPATIENT)
Dept: INFUSION THERAPY | Facility: HOSPITAL | Age: 77
End: 2017-09-21

## 2017-09-21 ENCOUNTER — TRANSFERRED RECORDS (OUTPATIENT)
Dept: HEALTH INFORMATION MANAGEMENT | Facility: CLINIC | Age: 77
End: 2017-09-21

## 2017-09-21 ENCOUNTER — OFFICE VISIT - HEALTHEAST (OUTPATIENT)
Dept: ONCOLOGY | Facility: HOSPITAL | Age: 77
End: 2017-09-21

## 2017-09-21 DIAGNOSIS — D47.2 MGUS (MONOCLONAL GAMMOPATHY OF UNKNOWN SIGNIFICANCE): ICD-10-CM

## 2017-09-21 LAB
IGA SERPL-MCNC: 1493 MG/DL (ref 65–400)
IGA SERPL-MCNC: 949 MG/DL (ref 700–1700)
IGM SERPL-MCNC: 48 MG/DL (ref 60–280)

## 2017-09-22 LAB
KAPPA FREE LIGHT CHAIN, S - HISTORICAL: 27.3 MG/DL (ref 0.33–1.94)
KAPPA/LAMBDA FLC RATIO - HISTORICAL: 11.3 (ref 0.26–1.65)
LAMBDA FREE LIGHT CHAIN, S - HISTORICAL: 2.42 MG/DL (ref 0.57–2.63)

## 2017-09-25 LAB
PATH ICD:: NORMAL
PROT PATTERN SERPL IFE-IMP: NORMAL
REVIEWING PATHOLOGIST: NORMAL

## 2017-09-28 ENCOUNTER — AMBULATORY - HEALTHEAST (OUTPATIENT)
Dept: ONCOLOGY | Facility: HOSPITAL | Age: 77
End: 2017-09-28

## 2017-09-28 ENCOUNTER — AMBULATORY - HEALTHEAST (OUTPATIENT)
Dept: CARDIOLOGY | Facility: CLINIC | Age: 77
End: 2017-09-28

## 2017-09-28 DIAGNOSIS — Z95.0 CARDIAC PACEMAKER IN SITU: ICD-10-CM

## 2017-09-28 DIAGNOSIS — D47.2 MGUS (MONOCLONAL GAMMOPATHY OF UNKNOWN SIGNIFICANCE): ICD-10-CM

## 2017-09-28 LAB
HCC DEVICE COMMENTS: NORMAL
KAPPA LC UR-MCNC: <0.9 MG/DL
KAPPA LC/LAMBDA UR: NORMAL {RATIO} (ref 0.7–6.2)
LAMBDA LC UR-MCNC: <0.7 MG/DL

## 2017-09-28 ASSESSMENT — MIFFLIN-ST. JEOR: SCORE: 1916.44

## 2017-09-29 LAB
PATH ICD:: NORMAL
PROT ELPH PNL UR ELPH: NORMAL
REVIEWING PATHOLOGIST: NORMAL

## 2017-10-06 NOTE — TELEPHONE ENCOUNTER
[facility] phone call message- general phone call:    Reason for call: would like to speak with a nurse.  It's personal.    Return call needed: Yes    OK to leave a message on voice mail? Yes    Primary language: English      needed? No    Call taken on October 6, 2017 at 9:42 AM by Rai Paet

## 2017-11-01 NOTE — PROGRESS NOTES
S: Eliana Bethea is a 77 year old male who returns for follow up of  abdominal pain  Follows with GI/will see GI soon     Patients states that main concern today is  Pain medicaitons    PMHX/PSHX/MEDS/ALLERGIES/SHX/FHX reviewed and updated in Epic.      ROS:  General: No fevers, chills  Head: No headache  Ears: No acute change in hearing.    CV: No chest pain or palpitations.  Resp: No shortness of breath.  No cough. No hemoptysis.  GI: No nausea, vomiting, constipation, diarrhea  : No urinary pains    O: /75  Pulse 60  Temp 97.5  F (36.4  C) (Oral)  Wt 257 lb 3.2 oz (116.7 kg)  BMI 32.15 kg/m2   Gen:  Well nourished and in NAD    CV:  RRR  - no murmurs, rubs, or gallups,   Pulm:  CTAB, no wheezes/rales/rhonchi, good air entry   ABD: soft, nontender, no masses, no rebound, BS intact throughout   Distended/tympanic to percussion  Extrem: no cyanosis, or clubbing   edema 1 plus as before  Psych: Euthymic      (R10.84) Generalized abdominal pain; diverticulosis  Comment GI appointment next week   Plan: HYDROcodone-acetaminophen (NORCO) 5-325 MG per         tablet        RTC  After  GI or sooner if develops new or worsening symptoms.    Moshe Corley

## 2017-11-01 NOTE — NURSING NOTE
"Injectable Influenza Immunization Documentation    1.  Has the patient received the information for the injectable influenza vaccine? YES     2. Is the patient 6 months of age or older? YES     3. Does the patient have any of the following contraindications?         Severe allergy to eggs? No     Severe allergic reaction to previous influenza vaccines? No   Severe allergy to latex? No       History of Guillain-[place] syndrome? No     Currently have a temperature greater than 100.4F? No        4.  Severely egg allergic patients should have flu vaccine eligibility assessed by an MD, RN, or pharmacist, and those who received flu vaccine should be observed for 15 min by an MD, RN, Pharmacist, Medical Technician, or member of clinic staff.\": YES    5. Latex-allergic patients should be given latex-free influenza vaccine Yes. Please reference the Vaccine latex table to determine if your clinic s product is latex-containing.       Vaccination given by ALBERTO Shell          "

## 2017-11-01 NOTE — MR AVS SNAPSHOT
After Visit Summary   2017    Eliana Bethea    MRN: [id number]           Patient Information     Date Of Birth          1940        Visit Information        Provider Department      2017 9:20 AM Moshe Corley MD [facility]        Today's Diagnoses     Need for vaccination    -  1    Generalized abdominal pain          Care Instructions    Fu with GI  Major problem is abdominal distention; gas, eat slowly, no carbonated drinks, offered simethicone but  You want to wait   ok to take  Vicodin one to two per day           Follow-ups after your visit        Who to contact     Please call your clinic at [phone] to:    Ask questions about your health    Make or cancel appointments    Discuss your medicines    Learn about your test results    Speak to your doctor   If you have compliments or concerns about an experience at your clinic, or if you wish to file a complaint, please contact [facility] Patient Relations at [phone] or email us at [email or web address]         Additional Information About Your Visit        MyChart Information     Mandoyo is an electronic gateway that provides easy, online access to your medical records. With Mandoyo, you can request a clinic appointment, read your test results, renew a prescription or communicate with your care team.     To sign up for Mandoyo visit the website at [email or web address]   You will be asked to enter the access code listed below, as well as some personal information. Please follow the directions to create your username and password.     Your access code is: [email or web address]  Expires: 2018  9:39 AM     Your access code will  in 90 days. If you need help or a new code, please contact your [facility] or call [phone] for assistance.        Care EveryWhere ID     This is your Care EveryWhere ID. This could be used by other organizations to  access your [place] medical records  [id number]        Your Vitals Were     Pulse Temperature BMI (Body Mass Index)             60 97.5  F (36.4  C) (Oral) 32.15 kg/m2          Blood Pressure from Last 3 Encounters:   11/01/17 151/75   09/14/17 139/73   08/02/17 114/67    Weight from Last 3 Encounters:   11/01/17 257 lb 3.2 oz (116.7 kg)   09/14/17 256 lb 3.2 oz (116.2 kg)   08/02/17 253 lb 2 oz (114.8 kg)              We Performed the Following     ADMIN VACCINE, INITIAL     FLU VACCINE, INCREASED ANTIGEN, PRESV FREE          Where to get your medicines      Some of these will need a paper prescription and others can be bought over the counter.  Ask your nurse if you have questions.     Bring a paper prescription for each of these medications     HYDROcodone-acetaminophen 5-325 MG per tablet          Primary Care Provider Office Phone # Fax #    Moshe Corley [phone]       [address] [place], [place] [id number]        Equal Access to Services     GAMALIEL EVANS : Hadii amairani vailo Sojere, waaxda luqadaha, qaybta kaalmada kirit, dlae rainey . So [facility] [phone].    ATENCIÓN: Si habla español, tiene a flanagan disposición servicios gratuitos de asistencia lingüística. Llame al [phone].    We comply with applicable federal civil rights laws and [place] laws. We do not discriminate on the basis of race, color, national origin, age, disability, sex, sexual orientation, or gender identity.            Thank you!     Thank you for choosing [facility]  for your care. Our goal is always to provide you with excellent care. Hearing back from our patients is one way we can continue to improve our services. Please take a few minutes to complete the written survey that you may receive in the mail after your visit with us. Thank you!             Your Updated Medication List - Protect others around you: Learn how to safely use, store and throw away your medicines at  www.disposemymeds.org.          This list is accurate as of: 11/1/17  9:39 AM.  Always use your most recent med list.                   Brand Name Dispense Instructions for use Diagnosis    acetaminophen 325 MG tablet    TYLENOL    60 tablet    650 mg bid as needed for  Pain NO MORE THAN 1300 mg per day    Abdominal pain, generalized       amLODIPine 2.5 MG tablet    NORVASC    60 tablet    One tab Twice a day    Essential hypertension with goal blood pressure less than 140/90       atorvastatin 20 MG tablet    LIPITOR    90 tablet    Take 1 tablet (20 mg) by mouth daily    Pure hypercholesterolemia       B-12 PO      Take 5,000 mcg by mouth daily        carvedilol 12.5 MG tablet    COREG    180 tablet    Take 1 tablet (12.5 mg) by mouth 2 times daily (with meals)    Chronic atrial fibrillation (H)       furosemide 40 MG tablet    LASIX    90 tablet    Take 2 tablets (80 mg) by mouth daily    Pedal edema       * HYDROcodone-acetaminophen 5-325 MG per tablet    NORCO    60 tablet    One to two per day as needed for abdominal pain    Lower abdominal pain       * HYDROcodone-acetaminophen 5-325 MG per tablet    NORCO    59 tablet    1 to 2 per day maximum 2tablet(s) per day    Generalized abdominal pain       indomethacin 75 MG CR capsule    INDOCIN SR    30 capsule    Take 1 capsule (75 mg) by mouth 2 times daily (with meals)    Acute idiopathic gout, unspecified site       linaclotide 145 MCG capsule    LINZESS    30 capsule    Take 1 capsule (145 mcg) by mouth every morning (before breakfast)    Slow transit constipation       lisinopril 40 MG tablet    PRINIVIL/ZESTRIL    30 tablet    Take 1 tablet (40 mg) by mouth daily Discontinue lisinopril 30 mg daily.  (dose increase)    Essential hypertension with goal blood pressure less than 140/90       MULTIVITAMIN ADULT PO           naproxen 375 MG tablet    NAPROSYN    30 tablet    As needed for gout    Acute idiopathic gout of right foot       omeprazole 20 MG CR  capsule    priLOSEC    90 capsule    Take 1 capsule (20 mg) by mouth daily    Gastroesophageal reflux disease with esophagitis       rivaroxaban ANTICOAGULANT 20 MG Tabs tablet    XARELTO    30 tablet    Take 1 tablet (20 mg) by mouth daily (with dinner)    Chronic atrial fibrillation (H)       Simethicone 125 MG Caps     28 capsule    1 to 3 per day  Per gas pain    Diverticulosis of large intestine without hemorrhage       spironolactone 25 MG tablet    ALDACTONE    30 tablet    Take 1 tablet (25 mg) by mouth daily    Benign essential hypertension       * Notice:  This list has 2 medication(s) that are the same as other medications prescribed for you. Read the directions carefully, and ask your doctor or other care provider to review them with you.

## 2017-11-01 NOTE — PATIENT INSTRUCTIONS
Fu with GI  Major problem is abdominal distention; gas, eat slowly, no carbonated drinks, offered simethicone but  You want to wait   ok to take  Vicodin one to two per day

## 2017-12-14 NOTE — MR AVS SNAPSHOT
After Visit Summary   2017    Eliana Bethea    MRN: [id number]           Patient Information     Date Of Birth          1940        Visit Information        Provider Department      2017 9:40 AM Moshe Corley MD [facility]        Today's Diagnoses     Abdominal pain, generalized    -  1    Acute gout, unspecified cause, unspecified site          Care Instructions    Refill Vicodin   prednisone for gout fu in 2 to 3 weeks              Follow-ups after your visit        Who to contact     Please call your clinic at [phone] to:    Ask questions about your health    Make or cancel appointments    Discuss your medicines    Learn about your test results    Speak to your doctor   If you have compliments or concerns about an experience at your clinic, or if you wish to file a complaint, please contact [facility] Patient Relations at [phone] or email us at [email or web address]         Additional Information About Your Visit        MyChart Information     Next[employer] is an electronic gateway that provides easy, online access to your medical records. With [employer], you can request a clinic appointment, read your test results, renew a prescription or communicate with your care team.     To sign up for Next[employer] visit the website at [email or web address]   You will be asked to enter the access code listed below, as well as some personal information. Please follow the directions to create your username and password.     Your access code is: [email or web address]  Expires: 2018  8:39 AM     Your access code will  in 90 days. If you need help or a new code, please contact your [facility] or call [phone] for assistance.        Care EveryWhere ID     This is your Care EveryWhere ID. This could be used by other organizations to access your [place] medical records  [id number]        Your Vitals Were     Pulse  Temperature BMI (Body Mass Index)             60 97.9  F (36.6  C) (Oral) 32.75 kg/m2          Blood Pressure from Last 3 Encounters:   12/14/17 154/76   11/01/17 151/75   09/14/17 139/73    Weight from Last 3 Encounters:   12/14/17 262 lb (118.8 kg)   11/01/17 257 lb 3.2 oz (116.7 kg)   09/14/17 256 lb 3.2 oz (116.2 kg)              Today, you had the following     No orders found for display         Today's Medication Changes          These changes are accurate as of: 12/14/17  9:53 AM.  If you have any questions, ask your nurse or doctor.               Start taking these medicines.        Dose/Directions    predniSONE 20 MG tablet   Commonly known as:  DELTASONE   Used for:  Acute gout, unspecified cause, unspecified site   Started by:  Moshe Corley MD        Dose:  20 mg   Take 1 tablet (20 mg) by mouth daily for 5 doses   Quantity:  5 tablet   Refills:  0         These medicines have changed or have updated prescriptions.        Dose/Directions    carvedilol 12.5 MG tablet   Commonly known as:  COREG   This may have changed:    - how much to take  - when to take this   Used for:  Chronic atrial fibrillation (H)        Dose:  12.5 mg   Take 1 tablet (12.5 mg) by mouth 2 times daily (with meals)   Quantity:  180 tablet   Refills:  11       * HYDROcodone-acetaminophen 5-325 MG per tablet   Commonly known as:  NORCO   This may have changed:  Another medication with the same name was added. Make sure you understand how and when to take each.   Used for:  Lower abdominal pain   Changed by:  Moshe Corley MD        One to two per day as needed for abdominal pain   Quantity:  60 tablet   Refills:  0       * HYDROcodone-acetaminophen 5-325 MG per tablet   Commonly known as:  NORCO   This may have changed:  Another medication with the same name was added. Make sure you understand how and when to take each.   Used for:  Generalized abdominal pain   Changed by:  Moshe Corley MD        1 to 2 per day maximum  2tablet(s) per day   Quantity:  59 tablet   Refills:  0       * HYDROcodone-acetaminophen 5-325 MG per tablet   Commonly known as:  NORCO   This may have changed:  You were already taking a medication with the same name, and this prescription was added. Make sure you understand how and when to take each.   Used for:  Abdominal pain, generalized   Changed by:  Moshe Corley MD        One to two per day as needed for pain maximum 2tablet(s) per day   Quantity:  18 tablet   Refills:  0       * Notice:  This list has 3 medication(s) that are the same as other medications prescribed for you. Read the directions carefully, and ask your doctor or other care provider to review them with you.         Where to get your medicines      Some of these will need a paper prescription and others can be bought over the counter.  Ask your nurse if you have questions.     Bring a paper prescription for each of these medications     HYDROcodone-acetaminophen 5-325 MG per tablet         Call your pharmacy to confirm that your medication is ready for pickup. It may take up to 24 hours for them to receive the prescription. If the prescription is not ready within 3 business days, please contact your clinic or your provider.     We will let you know when these medications are ready. If you don't hear back within 3 business days, please contact us.     predniSONE 20 MG tablet                Primary Care Provider Office Phone # Fax #    Moshe Corley [phone]       [address] [place] [address]        Equal Access to Services     [facility] NATHAN : Hadii amairani sanchez Sojere, waaxda luqadaha, qaybta kaalmada kirit, dale andrews. So [facility] [phone].    ATENCIÓN: Si habla español, tiene a flanagan disposición servicios gratuitos de asistencia lingüística. Llame al [phone].    We comply with applicable federal civil rights laws and [place] laws. We do not discriminate on the basis of race,  color, national origin, age, disability, sex, sexual orientation, or gender identity.            Thank you!     Thank you for choosing [facility]  for your care. Our goal is always to provide you with excellent care. Hearing back from our patients is one way we can continue to improve our services. Please take a few minutes to complete the written survey that you may receive in the mail after your visit with us. Thank you!             Your Updated Medication List - Protect others around you: Learn how to safely use, store and throw away your medicines at www.disposemymeds.org.          This list is accurate as of: 12/14/17  9:53 AM.  Always use your most recent med list.                   Brand Name Dispense Instructions for use Diagnosis    acetaminophen 325 MG tablet    TYLENOL    60 tablet    650 mg bid as needed for  Pain NO MORE THAN 1300 mg per day    Abdominal pain, generalized       amLODIPine 2.5 MG tablet    NORVASC    60 tablet    One tab Twice a day    Essential hypertension with goal blood pressure less than 140/90       atorvastatin 20 MG tablet    LIPITOR    90 tablet    Take 1 tablet (20 mg) by mouth daily    Pure hypercholesterolemia       B-12 PO      Take 5,000 mcg by mouth daily        carvedilol 12.5 MG tablet    COREG    180 tablet    Take 1 tablet (12.5 mg) by mouth 2 times daily (with meals)    Chronic atrial fibrillation (H)       furosemide 40 MG tablet    LASIX    90 tablet    Take 2 tablets (80 mg) by mouth daily    Pedal edema       * HYDROcodone-acetaminophen 5-325 MG per tablet    NORCO    60 tablet    One to two per day as needed for abdominal pain    Lower abdominal pain       * HYDROcodone-acetaminophen 5-325 MG per tablet    NORCO    59 tablet    1 to 2 per day maximum 2tablet(s) per day    Generalized abdominal pain       * HYDROcodone-acetaminophen 5-325 MG per tablet    NORCO    18 tablet    One to two per day as needed for pain maximum 2tablet(s) per day    Abdominal pain,  generalized       indomethacin 75 MG CR capsule    INDOCIN SR    30 capsule    Take 1 capsule (75 mg) by mouth 2 times daily (with meals)    Acute idiopathic gout, unspecified site       linaclotide 145 MCG capsule    LINZESS    30 capsule    Take 1 capsule (145 mcg) by mouth every morning (before breakfast)    Slow transit constipation       lisinopril 40 MG tablet    PRINIVIL/ZESTRIL    30 tablet    Take 1 tablet (40 mg) by mouth daily Discontinue lisinopril 30 mg daily.  (dose increase)    Essential hypertension with goal blood pressure less than 140/90       MULTIVITAMIN ADULT PO           naproxen 375 MG tablet    NAPROSYN    30 tablet    As needed for gout    Acute idiopathic gout of right foot       omeprazole 20 MG CR capsule    priLOSEC    90 capsule    Take 1 capsule (20 mg) by mouth daily    Gastroesophageal reflux disease with esophagitis       predniSONE 20 MG tablet    DELTASONE    5 tablet    Take 1 tablet (20 mg) by mouth daily for 5 doses    Acute gout, unspecified cause, unspecified site       rivaroxaban ANTICOAGULANT 20 MG Tabs tablet    XARELTO    30 tablet    Take 1 tablet (20 mg) by mouth daily (with dinner)    Chronic atrial fibrillation (H)       Simethicone 125 MG Caps     28 capsule    1 to 3 per day  Per gas pain    Diverticulosis of large intestine without hemorrhage       spironolactone 25 MG tablet    ALDACTONE    30 tablet    Take 1 tablet (25 mg) by mouth daily    Benign essential hypertension       * Notice:  This list has 3 medication(s) that are the same as other medications prescribed for you. Read the directions carefully, and ask your doctor or other care provider to review them with you.

## 2017-12-14 NOTE — PROGRESS NOTES
S: Eliana Bethea is a 77 year old male who returns for follow up of  Diverticulosis/on linzess  R hand pain f0r several days  Patients states that main concern today is abdominal pain    PMHX/PSHX/MEDS/ALLERGIES/SHX/FHX reviewed and updated in Epic.  Hx of gout    ROS:  General: No fevers, chills  Head: No headache  Ears: No acute change in hearing.    CV: No chest pain or palpitations.  Resp: No shortness of breath.  No cough. No hemoptysis.  GI: No nausea, vomiting, constipation, diarrhea  : No urinary pains    O: /76  Pulse 60  Temp 97.9  F (36.6  C) (Oral)  Wt 262 lb (118.8 kg)  BMI 32.75 kg/m2   Gen:  Well nourished and in NAD    CV:  RRR  - no murmurs, rubs, or gallups,   Pulm:  CTAB, no wheezes/rales/rhonchi, good air entry   ABD: soft, nontender, no masses, no rebound, BS intact throughout  Extrem: no cyanosis, edema or clubbing  Psych: Euthymic      Abdominal pain/diverticulosis  Refill Vicodin    linzess   R hand GOUT  acute   Prednisone  .    RTC in 6 weeks, for follow up of abdominal pain/r hand goutor sooner if develops new or worsening symptoms.    Moshe Corley

## 2018-01-01 ENCOUNTER — DOCUMENTATION ONLY (OUTPATIENT)
Dept: FAMILY MEDICINE | Facility: CLINIC | Age: 78
End: 2018-01-01

## 2018-01-01 ENCOUNTER — TRANSFERRED RECORDS (OUTPATIENT)
Dept: HEALTH INFORMATION MANAGEMENT | Facility: CLINIC | Age: 78
End: 2018-01-01

## 2018-01-01 ENCOUNTER — ANESTHESIA - HEALTHEAST (OUTPATIENT)
Dept: SURGERY | Facility: CLINIC | Age: 78
End: 2018-01-01

## 2018-01-01 ENCOUNTER — RECORDS - HEALTHEAST (OUTPATIENT)
Dept: ADMINISTRATIVE | Facility: OTHER | Age: 78
End: 2018-01-01

## 2018-01-01 ENCOUNTER — COMMUNICATION - HEALTHEAST (OUTPATIENT)
Dept: ADMINISTRATIVE | Facility: HOSPITAL | Age: 78
End: 2018-01-01

## 2018-01-01 ENCOUNTER — RECORDS - HEALTHEAST (OUTPATIENT)
Dept: VASCULAR ULTRASOUND | Facility: CLINIC | Age: 78
End: 2018-01-01

## 2018-01-01 ENCOUNTER — OFFICE VISIT (OUTPATIENT)
Dept: FAMILY MEDICINE | Facility: CLINIC | Age: 78
End: 2018-01-01
Payer: COMMERCIAL

## 2018-01-01 ENCOUNTER — OFFICE VISIT - HEALTHEAST (OUTPATIENT)
Dept: ONCOLOGY | Facility: HOSPITAL | Age: 78
End: 2018-01-01

## 2018-01-01 ENCOUNTER — COMMUNICATION - HEALTHEAST (OUTPATIENT)
Dept: VASCULAR SURGERY | Facility: CLINIC | Age: 78
End: 2018-01-01

## 2018-01-01 ENCOUNTER — AMBULATORY - HEALTHEAST (OUTPATIENT)
Dept: CARDIOLOGY | Facility: CLINIC | Age: 78
End: 2018-01-01

## 2018-01-01 ENCOUNTER — COMMUNICATION - HEALTHEAST (OUTPATIENT)
Dept: ONCOLOGY | Facility: HOSPITAL | Age: 78
End: 2018-01-01

## 2018-01-01 ENCOUNTER — TELEPHONE (OUTPATIENT)
Dept: FAMILY MEDICINE | Facility: CLINIC | Age: 78
End: 2018-01-01

## 2018-01-01 ENCOUNTER — OFFICE VISIT - HEALTHEAST (OUTPATIENT)
Dept: VASCULAR SURGERY | Facility: CLINIC | Age: 78
End: 2018-01-01

## 2018-01-01 ENCOUNTER — HOSPITAL ENCOUNTER (OUTPATIENT)
Dept: CARDIOLOGY | Facility: CLINIC | Age: 78
Discharge: HOME | End: 2018-06-12
Attending: INTERNAL MEDICINE

## 2018-01-01 ENCOUNTER — OFFICE VISIT - HEALTHEAST (OUTPATIENT)
Dept: CARDIOLOGY | Facility: CLINIC | Age: 78
End: 2018-01-01

## 2018-01-01 ENCOUNTER — SURGERY - HEALTHEAST (OUTPATIENT)
Dept: SURGERY | Facility: CLINIC | Age: 78
End: 2018-01-01

## 2018-01-01 ENCOUNTER — AMBULATORY - HEALTHEAST (OUTPATIENT)
Dept: VASCULAR SURGERY | Facility: CLINIC | Age: 78
End: 2018-01-01

## 2018-01-01 ENCOUNTER — COMMUNICATION - HEALTHEAST (OUTPATIENT)
Dept: CARDIOLOGY | Facility: CLINIC | Age: 78
End: 2018-01-01

## 2018-01-01 ENCOUNTER — AMBULATORY - HEALTHEAST (OUTPATIENT)
Dept: ONCOLOGY | Facility: HOSPITAL | Age: 78
End: 2018-01-01

## 2018-01-01 VITALS
TEMPERATURE: 97.8 F | BODY MASS INDEX: 32.57 KG/M2 | SYSTOLIC BLOOD PRESSURE: 146 MMHG | WEIGHT: 260.6 LBS | OXYGEN SATURATION: 100 % | RESPIRATION RATE: 16 BRPM | DIASTOLIC BLOOD PRESSURE: 78 MMHG | HEART RATE: 60 BPM

## 2018-01-01 VITALS
WEIGHT: 265 LBS | TEMPERATURE: 97.7 F | RESPIRATION RATE: 16 BRPM | DIASTOLIC BLOOD PRESSURE: 81 MMHG | SYSTOLIC BLOOD PRESSURE: 135 MMHG | OXYGEN SATURATION: 97 % | BODY MASS INDEX: 33.12 KG/M2 | HEART RATE: 63 BPM

## 2018-01-01 VITALS
BODY MASS INDEX: 29.44 KG/M2 | HEIGHT: 75 IN | WEIGHT: 236.8 LBS | HEART RATE: 60 BPM | OXYGEN SATURATION: 100 % | TEMPERATURE: 97.9 F | SYSTOLIC BLOOD PRESSURE: 108 MMHG | RESPIRATION RATE: 20 BRPM | DIASTOLIC BLOOD PRESSURE: 67 MMHG

## 2018-01-01 VITALS
HEART RATE: 64 BPM | WEIGHT: 259.13 LBS | SYSTOLIC BLOOD PRESSURE: 144 MMHG | BODY MASS INDEX: 32.39 KG/M2 | RESPIRATION RATE: 16 BRPM | DIASTOLIC BLOOD PRESSURE: 66 MMHG

## 2018-01-01 VITALS
DIASTOLIC BLOOD PRESSURE: 73 MMHG | TEMPERATURE: 97.7 F | RESPIRATION RATE: 18 BRPM | SYSTOLIC BLOOD PRESSURE: 126 MMHG | HEART RATE: 61 BPM | OXYGEN SATURATION: 98 % | BODY MASS INDEX: 32.25 KG/M2 | WEIGHT: 258 LBS

## 2018-01-01 VITALS
HEART RATE: 74 BPM | RESPIRATION RATE: 16 BRPM | WEIGHT: 244 LBS | DIASTOLIC BLOOD PRESSURE: 63 MMHG | BODY MASS INDEX: 30.5 KG/M2 | OXYGEN SATURATION: 98 % | TEMPERATURE: 98 F | SYSTOLIC BLOOD PRESSURE: 134 MMHG

## 2018-01-01 VITALS
OXYGEN SATURATION: 100 % | RESPIRATION RATE: 16 BRPM | BODY MASS INDEX: 32 KG/M2 | DIASTOLIC BLOOD PRESSURE: 73 MMHG | HEART RATE: 61 BPM | SYSTOLIC BLOOD PRESSURE: 143 MMHG | WEIGHT: 256 LBS | TEMPERATURE: 97.8 F

## 2018-01-01 VITALS
RESPIRATION RATE: 16 BRPM | SYSTOLIC BLOOD PRESSURE: 113 MMHG | WEIGHT: 250 LBS | OXYGEN SATURATION: 100 % | BODY MASS INDEX: 31.25 KG/M2 | HEART RATE: 69 BPM | TEMPERATURE: 97.7 F | DIASTOLIC BLOOD PRESSURE: 64 MMHG

## 2018-01-01 DIAGNOSIS — I36.1 NON-RHEUMATIC TRICUSPID VALVE INSUFFICIENCY: ICD-10-CM

## 2018-01-01 DIAGNOSIS — I50.9 CHF (CONGESTIVE HEART FAILURE) (H): ICD-10-CM

## 2018-01-01 DIAGNOSIS — T14.8XXA LOCAL INFECTION OF WOUND: ICD-10-CM

## 2018-01-01 DIAGNOSIS — L97.929 ULCER OF LOWER LIMB, LEFT, WITH UNSPECIFIED SEVERITY (H): ICD-10-CM

## 2018-01-01 DIAGNOSIS — I48.20 CHRONIC ATRIAL FIBRILLATION (H): ICD-10-CM

## 2018-01-01 DIAGNOSIS — D64.9 ANEMIA, UNSPECIFIED TYPE: ICD-10-CM

## 2018-01-01 DIAGNOSIS — R60.0 PEDAL EDEMA: ICD-10-CM

## 2018-01-01 DIAGNOSIS — I10 ESSENTIAL HYPERTENSION WITH GOAL BLOOD PRESSURE LESS THAN 140/90: ICD-10-CM

## 2018-01-01 DIAGNOSIS — R10.84 ABDOMINAL PAIN, GENERALIZED: ICD-10-CM

## 2018-01-01 DIAGNOSIS — L97.922 ULCER OF LEFT LOWER EXTREMITY WITH FAT LAYER EXPOSED (H): ICD-10-CM

## 2018-01-01 DIAGNOSIS — K57.32 DIVERTICULITIS OF COLON: Primary | ICD-10-CM

## 2018-01-01 DIAGNOSIS — D47.2 MGUS (MONOCLONAL GAMMOPATHY OF UNKNOWN SIGNIFICANCE): ICD-10-CM

## 2018-01-01 DIAGNOSIS — L97.929: ICD-10-CM

## 2018-01-01 DIAGNOSIS — K21.9 GASTROESOPHAGEAL REFLUX DISEASE WITHOUT ESOPHAGITIS: Primary | ICD-10-CM

## 2018-01-01 DIAGNOSIS — I10 BENIGN ESSENTIAL HYPERTENSION: ICD-10-CM

## 2018-01-01 DIAGNOSIS — E78.00 PURE HYPERCHOLESTEROLEMIA: ICD-10-CM

## 2018-01-01 DIAGNOSIS — Z95.0 CARDIAC PACEMAKER IN SITU: ICD-10-CM

## 2018-01-01 DIAGNOSIS — I50.32 CHRONIC DIASTOLIC HEART FAILURE (H): ICD-10-CM

## 2018-01-01 DIAGNOSIS — K76.9 LIVER CELL DAMAGE: ICD-10-CM

## 2018-01-01 DIAGNOSIS — L30.9 DERMATITIS: ICD-10-CM

## 2018-01-01 DIAGNOSIS — I77.1 ARTERIAL INSUFFICIENCY (H): ICD-10-CM

## 2018-01-01 DIAGNOSIS — I48.20 ATRIAL FIBRILLATION, CHRONIC (H): ICD-10-CM

## 2018-01-01 DIAGNOSIS — Z95.0 PACEMAKER: ICD-10-CM

## 2018-01-01 DIAGNOSIS — I73.9 PAD (PERIPHERAL ARTERY DISEASE) (H): ICD-10-CM

## 2018-01-01 DIAGNOSIS — I87.2 VENOUS (PERIPHERAL) INSUFFICIENCY: ICD-10-CM

## 2018-01-01 DIAGNOSIS — K21.9 GASTROESOPHAGEAL REFLUX DISEASE, ESOPHAGITIS PRESENCE NOT SPECIFIED: Primary | ICD-10-CM

## 2018-01-01 DIAGNOSIS — I87.2 CHRONIC VENOUS INSUFFICIENCY: ICD-10-CM

## 2018-01-01 DIAGNOSIS — K59.01 SLOW TRANSIT CONSTIPATION: ICD-10-CM

## 2018-01-01 DIAGNOSIS — L08.9 LOCAL INFECTION OF WOUND: ICD-10-CM

## 2018-01-01 DIAGNOSIS — F40.10 SOCIAL ANXIETY DISORDER: Primary | ICD-10-CM

## 2018-01-01 DIAGNOSIS — L30.9 DERMATITIS: Primary | ICD-10-CM

## 2018-01-01 DIAGNOSIS — I87.2 VENOUS INSUFFICIENCY (CHRONIC) (PERIPHERAL): ICD-10-CM

## 2018-01-01 DIAGNOSIS — D47.2 MGUS (MONOCLONAL GAMMOPATHY OF UNKNOWN SIGNIFICANCE): Primary | ICD-10-CM

## 2018-01-01 DIAGNOSIS — I10 BENIGN HYPERTENSION: Primary | ICD-10-CM

## 2018-01-01 DIAGNOSIS — D64.9 ANEMIA: ICD-10-CM

## 2018-01-01 DIAGNOSIS — M62.89 EXERCISE-INDUCED LEG FATIGUE: ICD-10-CM

## 2018-01-01 DIAGNOSIS — I73.9 PERIPHERAL VASCULAR DISEASE, UNSPECIFIED (H): ICD-10-CM

## 2018-01-01 DIAGNOSIS — K21.00 GASTROESOPHAGEAL REFLUX DISEASE WITH ESOPHAGITIS: ICD-10-CM

## 2018-01-01 DIAGNOSIS — R60.0 BILATERAL LEG EDEMA: ICD-10-CM

## 2018-01-01 LAB
ALBUMIN SERPL BCP-MCNC: 3.6 G/DL (ref 3.5–5)
ALP SERPL-CCNC: 114 U/L (ref 45–120)
ALT SERPL W/O P-5'-P-CCNC: 20 U/L (ref 0–45)
ANION GAP SERPL CALCULATED.3IONS-SCNC: 11 MMOL/L (ref 5–18)
AORTIC VALVE MEAN VELOCITY: 146 CM/S
AST SERPL-CCNC: 21 U/L (ref 0–40)
AV DIMENSIONLESS INDEX VTI: 0.6
AV MEAN GRADIENT: 9 MMHG
AV PEAK GRADIENT: 13.8 MMHG
AV VALVE AREA: 2.5 CM2
AV VELOCITY RATIO: 0.5
BACTERIA SPEC CULT: ABNORMAL
BACTERIA SPEC CULT: ABNORMAL
BASOPHILS # BLD AUTO: 0 THOU/UL (ref 0–0.2)
BASOPHILS NFR BLD AUTO: 1 % (ref 0–2)
BILIRUB SERPL-MCNC: 0.9 MG/DL (ref 0–1)
BSA FOR ECHO PROCEDURE: 2.48 M2
BUN SERPL-MCNC: 17 MG/DL (ref 8–28)
CALCIUM SERPL-MCNC: 9 MG/DL (ref 8.5–10.5)
CHLORIDE SERPL-SCNC: 104 MMOL/L (ref 98–107)
CO2 SERPL-SCNC: 26 MMOL/L (ref 22–31)
CREAT SERPL-MCNC: 0.89 MG/DL (ref 0.7–1.3)
CV BLOOD PRESSURE: NORMAL MMHG
CV ECHO HEIGHT: 75 IN
CV ECHO WEIGHT: 256 LBS
DOP CALC AO PEAK VEL: 186 CM/S
DOP CALC AO VTI: 35.4 CM
DOP CALC LVOT AREA: 4.15 CM2
DOP CALC LVOT DIAMETER: 2.3 CM
DOP CALC LVOT PEAK VEL: 102 CM/S
DOP CALC LVOT STROKE VOLUME: 88.9 CM3
DOP CALCLVOT PEAK VEL VTI: 21.4 CM
EJECTION FRACTION: 64 % (ref 55–75)
EOSINOPHIL # BLD AUTO: 0.1 THOU/UL (ref 0–0.4)
EOSINOPHIL NFR BLD AUTO: 2 % (ref 0–6)
ERYTHROCYTE [DISTWIDTH] IN BLOOD BY AUTOMATED COUNT: 13.1 % (ref 11–14.5)
FRACTIONAL SHORTENING: 17.1 % (ref 28–44)
GLUCOSE SERPL-MCNC: 92 MG/DL (ref 70–125)
GRAM STAIN RESULT: ABNORMAL
GRAM STAIN RESULT: ABNORMAL
HCC DEVICE COMMENTS: NORMAL
HCC DEVICE IMPLANTING PROVIDER: NORMAL
HCC DEVICE MANUFACTURE: NORMAL
HCC DEVICE MODEL: NORMAL
HCC DEVICE SERIAL NUMBER: NORMAL
HCC DEVICE TYPE: NORMAL
HCT VFR BLD AUTO: 37.6 % (ref 40–54)
HGB BLD-MCNC: 12.1 G/DL (ref 14–18)
IGA SERPL-MCNC: 981 MG/DL (ref 700–1700)
IGA: 1416 MG/DL (ref 65–400)
IGM SERPL-MCNC: 47 MG/DL (ref 60–280)
INTERVENTRICULAR SEPTUM IN END DIASTOLE: 1.3 CM (ref 0.6–1)
IVS/PW RATIO: 1.2
KAPPA FREE LT CHAIN: 19.65 MG/DL (ref 0.33–1.94)
KAPPA LAMBDA RATIO: 2.81 (ref 0.26–1.65)
LA AREA 1: 27.8 CM2
LA AREA 2: 31.4 CM2
LAMBDA FREE LT CHAIN: 7 MG/DL (ref 0.57–2.63)
LDH SERPL L TO P-CCNC: 165 U/L (ref 125–220)
LEFT ATRIUM LENGTH: 5.92 CM
LEFT ATRIUM SIZE: 5.2 CM
LEFT ATRIUM VOLUME INDEX: 50.5 ML/M2
LEFT ATRIUM VOLUME: 125.3 ML
LEFT VENTRICLE CARDIAC INDEX: 2.2 L/MIN/M2
LEFT VENTRICLE CARDIAC OUTPUT: 5.3 L/MIN
LEFT VENTRICLE DIASTOLIC VOLUME INDEX: 39.1 CM3/M2 (ref 34–74)
LEFT VENTRICLE DIASTOLIC VOLUME: 97 CM3 (ref 62–150)
LEFT VENTRICLE HEART RATE: 60 BPM
LEFT VENTRICLE MASS INDEX: 69.3 G/M2
LEFT VENTRICLE SYSTOLIC VOLUME INDEX: 14.1 CM3/M2 (ref 11–31)
LEFT VENTRICLE SYSTOLIC VOLUME: 35 CM3 (ref 21–61)
LEFT VENTRICULAR INTERNAL DIMENSION IN DIASTOLE: 4.1 CM (ref 4.2–5.8)
LEFT VENTRICULAR INTERNAL DIMENSION IN SYSTOLE: 3.4 CM (ref 2.5–4)
LEFT VENTRICULAR MASS: 171.7 G
LEFT VENTRICULAR OUTFLOW TRACT MEAN GRADIENT: 3 MMHG
LEFT VENTRICULAR OUTFLOW TRACT MEAN VELOCITY: 79.5 CM/S
LEFT VENTRICULAR OUTFLOW TRACT PEAK GRADIENT: 4 MMHG
LEFT VENTRICULAR POSTERIOR WALL IN END DIASTOLE: 1.1 CM (ref 0.6–1)
LV STROKE VOLUME INDEX: 35.8 ML/M2
LYMPHOCYTES # BLD AUTO: 1.5 THOU/UL (ref 0.8–4.4)
LYMPHOCYTES NFR BLD AUTO: 33 % (ref 20–40)
MCH RBC QN AUTO: 33.1 PG (ref 27–34)
MCHC RBC AUTO-ENTMCNC: 32.2 G/DL (ref 32–36)
MCV RBC AUTO: 103 FL (ref 80–100)
MITRAL REGURGITANT VELOCITY TIME INTEGRAL: 98.2 CM
MONOCYTES # BLD AUTO: 0.6 THOU/UL (ref 0–0.9)
MONOCYTES NFR BLD AUTO: 13 % (ref 2–10)
MR FLOW: 23 CM3
MR MEAN GRADIENT: 28 MMHG
MR MEAN VELOCITY: 259 CM/S
MR PEAK GRADIENT: 36.7 MMHG
MR PISA EROA: 0.2 CM2
MR PISA RADIUS: 0.6 CM
MR PISA VN NYQUIST: 30.3 CM/S
MV DECELERATION TIME: 158 MS
MV PEAK E VELOCITY: 111 CM/S
MV REGURGITANT VOLUME: 22.2 CC
NEUTROPHILS # BLD AUTO: 2.4 THOU/UL (ref 2–7.7)
NEUTROPHILS NFR BLD AUTO: 52 % (ref 50–70)
NUC REST DIASTOLIC VOLUME INDEX: 4096 LBS
NUC REST SYSTOLIC VOLUME INDEX: 75 IN
PISA MR PEAK VEL: 303 CM/S
PLATELET # BLD AUTO: 216 THOU/UL (ref 140–440)
PMV BLD AUTO: 11.1 FL (ref 8.5–12.5)
POTASSIUM SERPL-SCNC: 4.7 MMOL/L (ref 3.5–5)
PROT SERPL-MCNC: 7.6 G/DL (ref 6–8)
RBC # BLD AUTO: 3.66 MILL/UL (ref 4.4–6.2)
SODIUM SERPL-SCNC: 141 MMOL/L (ref 136–145)
TRICUSPID REGURGITATION PEAK PRESSURE GRADIENT: 23 MMHG
TRICUSPID VALVE ANULAR PLANE SYSTOLIC EXCURSION: 1.8 CM
TRICUSPID VALVE PEAK REGURGITANT VELOCITY: 240 CM/S
WBC # BLD AUTO: 4.6 THOU/UL (ref 4–11)

## 2018-01-01 RX ORDER — HYDROCODONE BITARTRATE AND ACETAMINOPHEN 5; 325 MG/1; MG/1
TABLET ORAL
Qty: 59 TABLET | Refills: 0 | Status: SHIPPED | OUTPATIENT
Start: 2018-01-01 | End: 2018-01-01

## 2018-01-01 RX ORDER — LISINOPRIL 40 MG/1
40 TABLET ORAL DAILY
Qty: 90 TABLET | Refills: 3 | Status: SHIPPED | OUTPATIENT
Start: 2018-01-01

## 2018-01-01 RX ORDER — HYDROCODONE BITARTRATE AND ACETAMINOPHEN 5; 325 MG/1; MG/1
TABLET ORAL
Qty: 59 TABLET | Refills: 0 | Status: SHIPPED | OUTPATIENT
Start: 2018-01-01

## 2018-01-01 RX ORDER — SPIRONOLACTONE 25 MG/1
25 TABLET ORAL DAILY
Qty: 90 TABLET | Refills: 3 | Status: SHIPPED | OUTPATIENT
Start: 2018-01-01

## 2018-01-01 RX ORDER — FUROSEMIDE 40 MG
80 TABLET ORAL DAILY
Qty: 90 TABLET | Refills: 3 | Status: SHIPPED | OUTPATIENT
Start: 2018-01-01

## 2018-01-01 RX ORDER — FAMOTIDINE 20 MG/1
TABLET, FILM COATED ORAL
Qty: 180 TABLET | Refills: 3 | Status: SHIPPED | OUTPATIENT
Start: 2018-01-01

## 2018-01-01 RX ORDER — FUROSEMIDE 40 MG
80 TABLET ORAL DAILY
Qty: 90 TABLET | Refills: 3 | Status: SHIPPED | OUTPATIENT
Start: 2018-01-01 | End: 2018-01-01

## 2018-01-01 RX ORDER — AMLODIPINE BESYLATE 2.5 MG/1
TABLET ORAL
Qty: 180 TABLET | Refills: 3 | Status: SHIPPED | OUTPATIENT
Start: 2018-01-01

## 2018-01-01 RX ORDER — ATORVASTATIN CALCIUM 20 MG/1
20 TABLET, FILM COATED ORAL DAILY
Qty: 90 TABLET | Refills: 0 | Status: SHIPPED | OUTPATIENT
Start: 2018-01-01 | End: 2018-01-01

## 2018-01-01 RX ORDER — PROPRANOLOL HYDROCHLORIDE 10 MG/1
10 TABLET ORAL 2 TIMES DAILY PRN
Qty: 90 TABLET | Refills: 3 | Status: SHIPPED | OUTPATIENT
Start: 2018-01-01 | End: 2018-01-01

## 2018-01-01 RX ORDER — FUROSEMIDE 40 MG
80 TABLET ORAL DAILY
Qty: 90 TABLET | Refills: 0 | Status: SHIPPED | OUTPATIENT
Start: 2018-01-01 | End: 2018-01-01

## 2018-01-01 RX ORDER — SILVER SULFADIAZINE 10 MG/G
CREAM TOPICAL 2 TIMES DAILY
Qty: 85 G | Refills: 1 | Status: SHIPPED | OUTPATIENT
Start: 2018-01-01 | End: 2018-01-01

## 2018-01-01 RX ORDER — SILVER SULFADIAZINE 10 MG/G
CREAM TOPICAL 2 TIMES DAILY
Qty: 85 G | Refills: 1 | Status: SHIPPED | OUTPATIENT
Start: 2018-01-01

## 2018-01-01 RX ORDER — ATORVASTATIN CALCIUM 20 MG/1
20 TABLET, FILM COATED ORAL DAILY
Qty: 90 TABLET | Refills: 3 | Status: SHIPPED | OUTPATIENT
Start: 2018-01-01

## 2018-01-01 RX ORDER — AMLODIPINE BESYLATE 2.5 MG/1
TABLET ORAL
Qty: 180 TABLET | Refills: 3 | Status: SHIPPED | OUTPATIENT
Start: 2018-01-01 | End: 2018-01-01

## 2018-01-01 ASSESSMENT — ANXIETY QUESTIONNAIRES
IF YOU CHECKED OFF ANY PROBLEMS ON THIS QUESTIONNAIRE, HOW DIFFICULT HAVE THESE PROBLEMS MADE IT FOR YOU TO DO YOUR WORK, TAKE CARE OF THINGS AT HOME, OR GET ALONG WITH OTHER PEOPLE: NOT DIFFICULT AT ALL
7. FEELING AFRAID AS IF SOMETHING AWFUL MIGHT HAPPEN: NOT AT ALL
5. BEING SO RESTLESS THAT IT IS HARD TO SIT STILL: NOT AT ALL
GAD7 TOTAL SCORE: 0
2. NOT BEING ABLE TO STOP OR CONTROL WORRYING: NOT AT ALL
3. WORRYING TOO MUCH ABOUT DIFFERENT THINGS: NOT AT ALL
6. BECOMING EASILY ANNOYED OR IRRITABLE: NOT AT ALL
1. FEELING NERVOUS, ANXIOUS, OR ON EDGE: NOT AT ALL
GAD7 TOTAL SCORE: 0

## 2018-01-01 ASSESSMENT — MIFFLIN-ST. JEOR
SCORE: 1978.13
SCORE: 1991.73
SCORE: 1864.73
SCORE: 1941.84
SCORE: 1941.84
SCORE: 1969.05
SCORE: 1928.23
SCORE: 1955.45
SCORE: 1887.41
SCORE: 2018.95
SCORE: 1926.25
SCORE: 1878.33
SCORE: 1887.41

## 2018-01-01 ASSESSMENT — PATIENT HEALTH QUESTIONNAIRE - PHQ9
SUM OF ALL RESPONSES TO PHQ QUESTIONS 1-9: 0
5. POOR APPETITE OR OVEREATING: NOT AT ALL
SUM OF ALL RESPONSES TO PHQ QUESTIONS 1-9: 0

## 2018-01-01 ASSESSMENT — ENCOUNTER SYMPTOMS
FATIGUE: 0
ACTIVITY CHANGE: 0
SORE THROAT: 0
FEVER: 0
APPETITE CHANGE: 0

## 2018-01-01 ASSESSMENT — PAIN SCALES - GENERAL: PAINLEVEL: MODERATE PAIN (5)

## 2018-01-08 NOTE — MR AVS SNAPSHOT
After Visit Summary   2018    Eliana Bethea    MRN: [id number]           Patient Information     Date Of Birth          1940        Visit Information        Provider Department      2018 11:20 AM Junior Clifton MD [facility]        Today's Diagnoses     Social anxiety disorder    -  1       Follow-ups after your visit        Who to contact     Please call your clinic at [phone] to:    Ask questions about your health    Make or cancel appointments    Discuss your medicines    Learn about your test results    Speak to your doctor   If you have compliments or concerns about an experience at your clinic, or if you wish to file a complaint, please contact [facility] Patient Relations at [phone] or email us at [email or web address]         Additional Information About Your Visit        MyChart Information     [employer] is an electronic gateway that provides easy, online access to your medical records. With [employer], you can request a clinic appointment, read your test results, renew a prescription or communicate with your care team.     To sign up for [employer] visit the website at [email or web address]   You will be asked to enter the access code listed below, as well as some personal information. Please follow the directions to create your username and password.     Your access code is: [email or web address]  Expires: 2018  8:39 AM     Your access code will  in 90 days. If you need help or a new code, please contact your [facility] or call [phone] for assistance.        Care EveryWhere ID     This is your Care EveryWhere ID. This could be used by other organizations to access your [place] medical records  [id number]        Your Vitals Were     Pulse Respirations BMI (Body Mass Index)             64 16 32.39 kg/m2          Blood Pressure from Last 3 Encounters:   18 144/66   17 154/76    11/01/17 151/75    Weight from Last 3 Encounters:   01/08/18 259 lb 2 oz (117.5 kg)   12/14/17 262 lb (118.8 kg)   11/01/17 257 lb 3.2 oz (116.7 kg)              Today, you had the following     No orders found for display         Today's Medication Changes          These changes are accurate as of: 1/8/18  2:00 PM.  If you have any questions, ask your nurse or doctor.               Start taking these medicines.        Dose/Directions    propranolol 10 MG tablet   Commonly known as:  INDERAL   Used for:  Social anxiety disorder   Started by:  Junior Clifton MD        Dose:  10 mg   Take 1 tablet (10 mg) by mouth 2 times daily as needed   Quantity:  90 tablet   Refills:  3         These medicines have changed or have updated prescriptions.        Dose/Directions    carvedilol 12.5 MG tablet   Commonly known as:  COREG   This may have changed:    - how much to take  - when to take this   Used for:  Chronic atrial fibrillation (H)        Dose:  12.5 mg   Take 1 tablet (12.5 mg) by mouth 2 times daily (with meals)   Quantity:  180 tablet   Refills:  11            Where to get your medicines      These medications were sent to [id number] - [place], [place] - [address] AT [facility] [address]  [address], [place] [address]     Phone:  [phone]     propranolol 10 MG tablet                Primary Care Provider Office Phone # Fax #    Moshe Corley [phone]       [address] [place] [address]        Equal Access to Services     GAMALIEL EVANS AH: Hadii amairani ku hadasho Soomaali, waaxda luqadaha, qaybta kaalmada adeegyada, dale andrews. So [facility] [phone].    ATENCIÓN: Si habla español, tiene a flanagan disposición servicios gratuitos de asistencia lingüística. Llame al [phone].    We comply with applicable federal civil rights laws and [place] laws. We do not discriminate on the basis of race, color, national origin, age, disability, sex,  sexual orientation, or gender identity.            Thank you!     Thank you for choosing [facility]  for your care. Our goal is always to provide you with excellent care. Hearing back from our patients is one way we can continue to improve our services. Please take a few minutes to complete the written survey that you may receive in the mail after your visit with us. Thank you!             Your Updated Medication List - Protect others around you: Learn how to safely use, store and throw away your medicines at www.disposemymeds.org.          This list is accurate as of: 1/8/18  2:00 PM.  Always use your most recent med list.                   Brand Name Dispense Instructions for use Diagnosis    acetaminophen 325 MG tablet    TYLENOL    60 tablet    650 mg bid as needed for  Pain NO MORE THAN 1300 mg per day    Abdominal pain, generalized       amLODIPine 2.5 MG tablet    NORVASC    60 tablet    One tab Twice a day    Essential hypertension with goal blood pressure less than 140/90       atorvastatin 20 MG tablet    LIPITOR    90 tablet    Take 1 tablet (20 mg) by mouth daily    Pure hypercholesterolemia       B-12 PO      Take 5,000 mcg by mouth daily        carvedilol 12.5 MG tablet    COREG    180 tablet    Take 1 tablet (12.5 mg) by mouth 2 times daily (with meals)    Chronic atrial fibrillation (H)       furosemide 40 MG tablet    LASIX    90 tablet    Take 2 tablets (80 mg) by mouth daily    Pedal edema       * HYDROcodone-acetaminophen 5-325 MG per tablet    NORCO    60 tablet    One to two per day as needed for abdominal pain    Lower abdominal pain       * HYDROcodone-acetaminophen 5-325 MG per tablet    NORCO    59 tablet    1 to 2 per day maximum 2tablet(s) per day    Generalized abdominal pain       * HYDROcodone-acetaminophen 5-325 MG per tablet    NORCO    59 tablet    One to two as needed for pain  maximum2 tablet(s) per day    Abdominal pain, generalized       indomethacin 75 MG CR capsule     INDOCIN SR    30 capsule    Take 1 capsule (75 mg) by mouth 2 times daily (with meals)    Acute idiopathic gout, unspecified site       linaclotide 145 MCG capsule    LINZESS    30 capsule    Take 1 capsule (145 mcg) by mouth every morning (before breakfast)    Slow transit constipation       lisinopril 40 MG tablet    PRINIVIL/ZESTRIL    30 tablet    Take 1 tablet (40 mg) by mouth daily Discontinue lisinopril 30 mg daily.  (dose increase)    Essential hypertension with goal blood pressure less than 140/90       MULTIVITAMIN ADULT PO           naproxen 375 MG tablet    NAPROSYN    30 tablet    As needed for gout    Acute idiopathic gout of right foot       omeprazole 20 MG CR capsule    priLOSEC    90 capsule    Take 1 capsule (20 mg) by mouth daily    Gastroesophageal reflux disease with esophagitis       propranolol 10 MG tablet    INDERAL    90 tablet    Take 1 tablet (10 mg) by mouth 2 times daily as needed    Social anxiety disorder       rivaroxaban ANTICOAGULANT 20 MG Tabs tablet    XARELTO    30 tablet    Take 1 tablet (20 mg) by mouth daily (with dinner)    Chronic atrial fibrillation (H)       Simethicone 125 MG Caps     28 capsule    1 to 3 per day  Per gas pain    Diverticulosis of large intestine without hemorrhage       spironolactone 25 MG tablet    ALDACTONE    30 tablet    Take 1 tablet (25 mg) by mouth daily    Benign essential hypertension       * Notice:  This list has 3 medication(s) that are the same as other medications prescribed for you. Read the directions carefully, and ask your doctor or other care provider to review them with you.

## 2018-01-08 NOTE — PROGRESS NOTES
"\"Jahaira\" comes in today with plaints of \"stage fright.\"  He states that he had this about 5 years ago and saw Dr. Singh for it.  At that point he was given a prescription for propranolol, 10 mg, which was efficacious.  He states he has not needed it for the last 5 years.  He states though that in December he was unable to read his assigned passage for his [employer] because of anxiety.  He had just one else to the reading for him.  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  He calls this \"stage fright.\"  He really does not know why he is having this again now after 5 years.  He reports no new stressors in his life.  He has good relationships with his family members.  He has no new financial stressors.  He is sleeping well.  He does not report any decreased mood, depression, suicidal ideation, etc.    Allergies, medications and problem list reviewed and updated as needed in Epic.    REVIEW OF SYSTEMS    General: No fevers  Head: No headache  Neck: No swallowing problems   CV: No chest pain  Resp: No shortness of breath.    GI: No nausea or vomiting    /66  Pulse 64  Resp 16  Wt 259 lb 2 oz (117.5 kg)  BMI 32.39 kg/m2    Gen:  Well nourished and in NAD  HEENT:   Neck: supple without lymphadenopathy  CV:  Seems regular to my exam with no murmurs, rubs, or gallups,   Pulm:  CTAB, no wheezes/rales/rhonchi, good air entry   ABD: soft, nontender,  Extrem: no edema  Psych: Euthymic     Assessment:    Patient is describing social anxiety.  He is on Coreg already for his heart failure and atrial fibrillation vitamins on a low dose.  His blood pressure is actually high today.  His heart rate is in the 60s.  I think it is reasonable to try a low-dose propranolol.  Will have him take 10 mg twice a day as needed for feelings of overwhelming anxiety.  He will follow-up with Dr. Corley in about a month.  "

## 2018-02-06 NOTE — MR AVS SNAPSHOT
After Visit Summary   2018    Eliana Bethea    MRN: [id number]           Patient Information     Date Of Birth          1940        Visit Information        Provider Department      2018 9:40 AM Aleena Jones MD [facility]        Today's Diagnoses     Abdominal pain, generalized           Follow-ups after your visit        Who to contact     Please call your clinic at [phone] to:    Ask questions about your health    Make or cancel appointments    Discuss your medicines    Learn about your test results    Speak to your doctor            Additional Information About Your Visit        MyChart Information     [employer] is an electronic gateway that provides easy, online access to your medical records. With H-care, you can request a clinic appointment, read your test results, renew a prescription or communicate with your care team.     To sign up for [employer] visit the website at [email or web address]   You will be asked to enter the access code listed below, as well as some personal information. Please follow the directions to create your username and password.     Your access code is: [email or web address]  Expires: 2018  1:32 PM     Your access code will  in 90 days. If you need help or a new code, please contact your [facility] or call [phone] for assistance.        Care EveryWhere ID     This is your Care EveryWhere ID. This could be used by other organizations to access your [facility] medical records  [id number]        Your Vitals Were     Pulse Temperature Respirations Pulse Oximetry BMI (Body Mass Index)       60 97.8  F (36.6  C) (Oral) 16 100% 32.57 kg/m2        Blood Pressure from Last 3 Encounters:   18 146/78   18 144/66   17 154/76    Weight from Last 3 Encounters:   18 260 lb 9.6 oz (118.2 kg)   18 259 lb 2 oz (117.5 kg)   17 262 lb (118.8 kg)              Today, you had the following      No orders found for display         Today's Medication Changes          These changes are accurate as of 2/6/18 11:59 PM.  If you have any questions, ask your nurse or doctor.               These medicines have changed or have updated prescriptions.        Dose/Directions    carvedilol 12.5 MG tablet   Commonly known as:  COREG   This may have changed:    - how much to take  - when to take this   Used for:  Chronic atrial fibrillation (H)        Dose:  12.5 mg   Take 1 tablet (12.5 mg) by mouth 2 times daily (with meals)   Quantity:  180 tablet   Refills:  11       HYDROcodone-acetaminophen 5-325 MG per tablet   Commonly known as:  NORCO   This may have changed:  Another medication with the same name was removed. Continue taking this medication, and follow the directions you see here.   Used for:  Abdominal pain, generalized   Changed by:  Aleena Jones MD        One to two as needed for pain  maximum2 tablet(s) per day   Quantity:  59 tablet   Refills:  0            Where to get your medicines      Some of these will need a paper prescription and others can be bought over the counter.  Ask your nurse if you have questions.     Bring a paper prescription for each of these medications     HYDROcodone-acetaminophen 5-325 MG per tablet                Primary Care Provider Office Phone # Fax #    Moshe Corley [phone]       [address] [place] [address]        Equal Access to Services     GAMALIEL EVANS AH: J Luis vailo Sodruali, waaxda luqadaha, qaybta kaalmada adeegyada, dale andrews. So [facility] [phone].    ATENCIÓN: Si habla español, tiene a flanagan disposición servicios gratuitos de asistencia lingüística. Llame al [phone].    We comply with applicable federal civil rights laws and [place] laws. We do not discriminate on the basis of race, color, national origin, age, disability, sex, sexual orientation, or gender identity.            Thank you!      Thank you for choosing [facility]  for your care. Our goal is always to provide you with excellent care. Hearing back from our patients is one way we can continue to improve our services. Please take a few minutes to complete the written survey that you may receive in the mail after your visit with us. Thank you!             Your Updated Medication List - Protect others around you: Learn how to safely use, store and throw away your medicines at www.disposemymeds.org.          This list is accurate as of 2/6/18 11:59 PM.  Always use your most recent med list.                   Brand Name Dispense Instructions for use Diagnosis    acetaminophen 325 MG tablet    TYLENOL    60 tablet    650 mg bid as needed for  Pain NO MORE THAN 1300 mg per day    Abdominal pain, generalized       amLODIPine 2.5 MG tablet    NORVASC    60 tablet    One tab Twice a day    Essential hypertension with goal blood pressure less than 140/90       atorvastatin 20 MG tablet    LIPITOR    90 tablet    Take 1 tablet (20 mg) by mouth daily    Pure hypercholesterolemia       B-12 PO      Take 5,000 mcg by mouth daily        carvedilol 12.5 MG tablet    COREG    180 tablet    Take 1 tablet (12.5 mg) by mouth 2 times daily (with meals)    Chronic atrial fibrillation (H)       furosemide 40 MG tablet    LASIX    90 tablet    Take 2 tablets (80 mg) by mouth daily    Pedal edema       HYDROcodone-acetaminophen 5-325 MG per tablet    NORCO    59 tablet    One to two as needed for pain  maximum2 tablet(s) per day    Abdominal pain, generalized       indomethacin 75 MG CR capsule    INDOCIN SR    30 capsule    Take 1 capsule (75 mg) by mouth 2 times daily (with meals)    Acute idiopathic gout, unspecified site       linaclotide 145 MCG capsule    LINZESS    30 capsule    Take 1 capsule (145 mcg) by mouth every morning (before breakfast)    Slow transit constipation       lisinopril 40 MG tablet    PRINIVIL/ZESTRIL    30 tablet    Take 1 tablet (40  mg) by mouth daily Discontinue lisinopril 30 mg daily.  (dose increase)    Essential hypertension with goal blood pressure less than 140/90       MULTIVITAMIN ADULT PO           naproxen 375 MG tablet    NAPROSYN    30 tablet    As needed for gout    Acute idiopathic gout of right foot       omeprazole 20 MG CR capsule    priLOSEC    90 capsule    Take 1 capsule (20 mg) by mouth daily    Gastroesophageal reflux disease with esophagitis       propranolol 10 MG tablet    INDERAL    90 tablet    Take 1 tablet (10 mg) by mouth 2 times daily as needed    Social anxiety disorder       rivaroxaban ANTICOAGULANT 20 MG Tabs tablet    XARELTO    30 tablet    Take 1 tablet (20 mg) by mouth daily (with dinner)    Chronic atrial fibrillation (H)       Simethicone 125 MG Caps     28 capsule    1 to 3 per day  Per gas pain    Diverticulosis of large intestine without hemorrhage       spironolactone 25 MG tablet    ALDACTONE    30 tablet    Take 1 tablet (25 mg) by mouth daily    Benign essential hypertension

## 2018-02-06 NOTE — PROGRESS NOTES
"Chief Complaint  pain    History of Present Illness  Eilana Bethea is a 77 year old male with a history of diverticulosis who presents with significant abdominal pain. Normally sees Dr. Corley, for the last 3 years. He takes Vicodin for pain and now only has one pill left, usually once a day and tries to take it at night to help him sleep despite the pain.    Certain foods make the pain worse, like popcorn. He has \"attacks of pain\" that will double him over and has been brought to the hospital numerous times by EMTs and bystanders.  Pain starts in \"deep groin\" and around the new year has started to radiate all the way up to \"under my heart\". He is scheduled to see [facility] in late March for a colonoscopy but they are hoping to get him in sooner.    Will occasionally have palpitations, followed by [facility].    Review of Systems  A 5 point ROS was performed and is negative other than what is stated in the HPI.   General: No headache, fevers, or chills  Respiratory: No shortness of breath; no coughing or hemoptysis  Cardiovascular: No chest pain or heart racing  Gastrointestinal: No nausea, vomiting, constipation, or diarrhea  Genitourinary: No changes in urination, no dysuria     PMH, Medications and Allergies were reviewed and updated as needed.    Objective  Physical Exam  Blood pressure 146/78, pulse 60, temperature 97.8  F (36.6  C), temperature source Oral, resp. rate 16, weight 260 lb 9.6 oz (118.2 kg), SpO2 100 %. Body mass index is 32.57 kg/(m^2).  GENERAL APPEARANCE: Elderly gentleman, pleasant and in NAD.  HEENT: Normocephalic; extraocular movements intact; no rhinorrhea or congestion;   CHEST/RESP: Breathing comfortably without increased effort or accessory muscle use. Clear breath sounds bilaterally without rhonchi, rales, wheezing, or rubs.   HEART/CV: Regular rate and normal rhythm without murmurs, rubs, or gallops. No peripheral pitting edema.    Assessment & Plan  Eliana Bethea is a 77 year " old male with a history of diverticulosis and chronic abdominal pain who presents for pain medication refill. Normally sees Dr. Corley and has been consistent with pain medications over the last several years, coming in about every 2 months for a refill.    - Continue Hydrocodone-Acetaminophen 1-2 tabs daily as needed for pain.  #59, R0.  - Continue follow-up with [facility] for colonoscopy.    Christiano Orantes, MS4  2/6/2018, 9:59 AM

## 2018-02-13 NOTE — PROGRESS NOTES
The medical student acted as a scribe and the encounter documented above was performed completely by me and the documentation accurately reflects the work I have performed today. Aleena Jones MD

## 2018-03-07 NOTE — TELEPHONE ENCOUNTER
[facility] phone call message- general phone call:    Reason for call: He has ordered coming from [facility] and he wants a call when we get them so he can schedule an appointment to come in.    Return call needed: Yes    OK to leave a message on voice mail? Yes    Primary language: English      needed? No    Call taken on March 7, 2018 at 2:02 PM by Gage Brasher

## 2018-03-07 NOTE — TELEPHONE ENCOUNTER
Patient states that the Cancer center was suppose to expedite some orders that he needed to get drawn here and he would like to know if these orders have been received. /JAM Chandra      Nurse spoke with lab department who states they have not received any orders as of yet. Patient will contact [place]/JAM Chandra

## 2018-03-09 NOTE — LETTER
March 14, 2018      Eliana Bethea  [address]   [place] [address]        Dear Eliana,    Please see below for your test results.    Resulted Orders   CBC w/ Diff and Plt ([facility])   Result Value Ref Range    WBC 4.6 4.0 - 11.0 thou/uL    RBC 3.66 (L) 4.40 - 6.20 mill/uL    Hemoglobin 12.1 (L) 14.0 - 18.0 g/dL    Hematocrit 37.6 (L) 40.0 - 54.0 %     (H) 80 - 100 fL    MCH 33.1 27.0 - 34.0 pg    MCHC 32.2 32.0 - 36.0 g/dL    RDW 13.1 11.0 - 14.5 %    Platelets 216 140 - 440 thou/uL    Mean Platelet Volume 11.1 8.5 - 12.5 fL    % Neutrophils 52 50 - 70 %    % Lymphocytes 33 20 - 40 %    % Monocytes 13 (H) 2 - 10 %    % Eosinophils 2 0 - 6 %    % Basophils 1 0 - 2 %    Neutrophils (Absolute) 2.4 2.0 - 7.7 thou/uL    Lymphs (Absolute) 1.5 0.8 - 4.4 thou/uL    Monocytes(Absolute) 0.6 0.0 - 0.9 thou/uL    Eos (Absolute) 0.1 0.0 - 0.4 thou/uL    Baso (Absolute) 0.0 0.0 - 0.2 thou/uL    Narrative    Test performed by:  [facility]  [address] [place] [address]   Immunoglobulins ([facility])   Result Value Ref Range     700 - 1700 mg/dL    IGM 47 (L) 60 - 280 mg/dL    IGA 1416 (H) 65 - 400 mg/dL    Narrative    Test performed by:  [facility]  [address] [place] [address]       If you have any questions, please call the clinic to make an appointment.    Sincerely,    Moshe Corley M.D

## 2018-03-14 NOTE — PROGRESS NOTES
Labs from 3/9/18 faxed to [facility] at [phone]. Gigi/bonita

## 2018-03-20 NOTE — PROGRESS NOTES
Medication Management Note                                                       Eliana was referred by Dr. Corley for pharmacy services for med management.    MEDICATION REVIEW:  Discussed all medication indications, dosage and effectiveness, adverse effects, and adherence with patient/caregiver.    Pt had meds with them: no  Pt had med list with them: no  Pt was knowledgeable about meds: somewhat - patient recognizes medication names and indications, but does not know doses or frequencies unless they are PRN  Medications set up by: self  Medications administered by someone else (e.g., LTCF): No  Pt uses a medication box or automated dispenser: no - patient not interested in using a pill box either  Called pharmacy to obtain or clarify med list:  no  Called HHN or LTCF to obtain or clarify med list:  no    Medication Discrepancies  Medications on EMR med list that pt is NOT taking:  Yes    Patient never picked up the indomethacin for gout because it was too expensive, so it was removed from his list.    Patient states he no longer uses naproxen for gout either because his symptoms are so infrequent, so it was removed from his list    Simethicone removed from list as patient does not use it, stating it did not work   Medications pt IS taking that are NOT on EMR med list (e.g., from specialist, hospital): yes    Patient describes using a topical cream for his leg edema PRN but could not recall its name. Cream was not added to his list as the exact identity is not known.    OTC meds/ dietary supplements pt taking on own that are NOT on EMR med list:  Yes    Patient states he occasionally uses Visine eye drops. Medication not added to list as patient only uses it only rarely  Dosage listed differently than how patient is taking: yes, but difficult to assess    Patient states he is only taking 20-80 mg of furosemide PRN edema depending on how much fluid he drinks each day. He does not wish to change this  "dosing  Frequency listed differently than how patient is taking: yes, but difficult to assess:    Patient states he uses linaclotide PRN constipation usually about 2 times per week, but that it helps when he takes it.   Duplicate medication on list (two occurrences of the same medication):  none  TOTAL NUMBER OF MEDICATION DISCREPANCIES:  3    Subjective                                                       Patient reports the following problems or concerns with their medications:  none  Patient reports the following adverse reactions to medications:  None, patient denies signs and symptoms of bleeding while on rivaroxaban  Pt reports missing doses: never  Additional subjective information (e.g., reason for visit, frequency of PRNs, reasons meds were D/C ed):    Patient's preferred pharmacy is [facility] on [address] and .     He manages his medications by himself. He sets all the bottles up in a row and goes down the row, reading the labels and taking them right after reading them. He described in taking out the # of pills needed for the day in the AM. He has no interest in using a pill box. He states he takes his medications 2-3 times throughout the day, and when he is busy or going out, he will put them all in a sandwich bag and carry them with him.     Patient states he uses a cream topically \"for edema\". This could potentially be the silver sulfadiazine cream he is referring to, but all the Rxs for this in Epic have . It is possible he may still have some at home he is using if he uses it infrequently or saves it.     Patient takes APAP and Norco PRN gout and diverticular pain. He states his gout pain is infrequent and only happens about every other month.     He is more concerned about his GI pain. However, he is only using his linaclotide as needed about 2 times per week, stating \"it's a powerful laxative.\"    Patient takes 20 mg (1/2 tab) to 80 mg furosemide PRN leg edema, which depends on how much " fluid he consumes.    Patient states he is using omeprazole daily. He denies history of ulcer or GI bleed, and states he has no symptoms of heartburn.     He has carvedilol for his CHF and propranolol PRN anxiety. He states he only needs the propranolol about 1 time per month. He reports his last bottle of propranolol lasted him for years.     Objective                                                       Patient Active Problem List   Diagnosis     Aphakia     Aneurysm of abdominal aorta (H)     Benign hypertension     Cardiac pacemaker  present     Health Care Home     Gout, chronic     Complete colonoscopy     Depressive disorder, not elsewhere classified      knee replacement     Male erectile disorder     History of shoulder replacement     Chronic atrial fibrillation (H)     Anticoagulation adequate with anticoagulant therapy     S/P carotid endarterectomy     Carpal tunnel syndrome on right     Single vessel coronary artery disease     Anemia     S/P hernia repair     Liver cell damage     Alcohol dependence (H)     Chronic systolic congestive heart failure (H)     Diverticulosis of large intestine     Abnormal SPEP     Current Outpatient Prescriptions   Medication Sig Dispense Refill     HYDROcodone-acetaminophen (NORCO) 5-325 MG per tablet One to two as needed for pain   maximum2 tablet(s) per day 59 tablet 0     furosemide (LASIX) 40 MG tablet Take 2 tablets (80 mg) by mouth daily 90 tablet 0     atorvastatin (LIPITOR) 20 MG tablet Take 1 tablet (20 mg) by mouth daily 90 tablet 0     omeprazole (PRILOSEC) 20 MG CR capsule Take 1 capsule (20 mg) by mouth daily 90 capsule 0     propranolol (INDERAL) 10 MG tablet Take 1 tablet (10 mg) by mouth 2 times daily as needed 90 tablet 3     carvedilol (COREG) 12.5 MG tablet Take 1 tablet (12.5 mg) by mouth 2 times daily (with meals) (Patient taking differently: Take 6.25 mg by mouth daily ) 180 tablet 11     acetaminophen (TYLENOL) 325 MG tablet 650 mg bid as needed  for  Pain NO MORE THAN 1300 mg per day 60 tablet 1     Multiple Vitamins-Minerals (MULTIVITAMIN ADULT PO)        Cyanocobalamin (B-12 PO) Take 5,000 mcg by mouth daily        lisinopril (PRINIVIL/ZESTRIL) 40 MG tablet Take 1 tablet (40 mg) by mouth daily Discontinue lisinopril 30 mg daily.  (dose increase) 30 tablet 11     rivaroxaban ANTICOAGULANT (XARELTO) 20 MG TABS tablet Take 1 tablet (20 mg) by mouth daily (with dinner) 30 tablet 11     amLODIPine (NORVASC) 2.5 MG tablet One tab Twice a day 60 tablet 11     linaclotide (LINZESS) 145 MCG capsule Take 1 capsule (145 mcg) by mouth every morning (before breakfast) (Patient not taking: Reported on 12/14/2017) 30 capsule 3     spironolactone (ALDACTONE) 25 MG tablet Take 1 tablet (25 mg) by mouth daily (Patient not taking: Reported on 12/14/2017) 30 tablet 11     Social History   Substance Use Topics     Smoking status: Never Smoker     Smokeless tobacco: Never Used     Alcohol use Yes     Estimated Creatinine Clearance: 91.3 mL/min (based on Cr of 0.89).    No results found for: A1C  Last Basic Metabolic Panel:  Lab Results   Component Value Date     03/09/2018      Lab Results   Component Value Date    POTASSIUM 4.7 03/09/2018     Lab Results   Component Value Date    CHLORIDE 104 03/09/2018     Lab Results   Component Value Date    ANDRE 9.0 03/09/2018     Lab Results   Component Value Date    CO2 31.6 09/06/2016     Lab Results   Component Value Date    BUN 17 03/09/2018     Lab Results   Component Value Date    CR 0.89 03/09/2018     Lab Results   Component Value Date    GLC 92 03/09/2018     BP Readings from Last 3 Encounters:   03/20/18 126/73   02/06/18 146/78   01/08/18 144/66     The ASCVD Risk score (Jennie NOEMI Jr, et al., 2013) failed to calculate for the following reasons:    The 2013 ASCVD risk score is only valid for ages 40 to 79    PHQ-9 score:    PHQ-9 SCORE 3/20/2018   Total Score -   Total Score 0     Assessment  "                                                      HFrEF/HTN/CAD -  controlled     Patient has the following cardiovascular medications: amlodipine, atorvastatin, carvedilol, furosemide, lisinopril, and spironolactone.     Patient has furosemide prescribed daily, but he states he \"knows what is going on in his body\" and self-titrates his furosemide based on swelling and fluid intake. Calculated CrCL is ~92 mL/min.     A moderate intensity statin is appropriate given that his last LDL was 66 in Aug 2016.     Patient already on maximum dose of lisinopril, and BP in clinic today was 126/73.    With HR of 61 in clinic today, increasing his beta-blocker dose toward goal carvedilol dose in CHF may not be tolerated.    Patient not a candidate for prophylactic ASA due to concurrent rivaroxaban    Gout -  controlled     Patient only has symptoms about once every other month.    He is taking APAP and Norco as needed for pain but does not seem to need further pharmacotherapy.     Afib -  controlled     Patient's carvedilol which also serves for rate control.     Patient takes rivaroxaban.    He has an upcoming colonoscopy in a few weeks and will need to hold his rivaroxaban prior to the procedure per the 2017 ACC periprocedural consensus article.     Heartburn -  controlled     Patient takes omeprazole regularly but denies symptoms of heartburn. No h/o GI bleed or ulcer.    Patient agreeable to stopping this medication and is a good candidate for a PPI taper.     Diverticulosis -  uncontrolled     Patient takes APAP and Norco as needed for pain. He is also taking linaclotide.     He endorses ongoing pain and symptoms, defer to primary.      Plan/Recommendations                                                       Updated medication list in the EMR; deleted meds patient no longer taking and added meds patient is now taking, and changed doses where there was a dose discrepancy.    All medications were reviewed and found to " be indicated, effective, safe and convenient/ affordable unless drug therapy problem(s) was/were identified, as are described below.      Completed at this visit     Afib     Hold rivaroxaban for 48 hour prior to colonoscopy, and restart taking it within 24 hours after colonoscopy.     Heartburn     Discontinue omeprazole daily.    Initiate PPI taper as follows    2 weeks alternating omeprazole 20 mg QDay and famotidine 20 mg BID    2 weeks famotidine 20 mg BID    2 weeks famotidine 10 mg BID    2 weeks famotidine 10 mg QDay    Famotidine 10 mg daily PRN thereafter or discontinuation     To be completed at a future visit  Heartburn    Reassess progress with PPI taper and symptoms.     Options for treatment and/or follow-up care were reviewed with the patient.  Eliana was engaged and actively involved in the decision making process, verbalized understanding of the options discussed, and was satisfied with the final plan.    Follow-up                                                       Patient should follow up with Dr. Corley.  Patient was provided with written instructions/medication list via AVS.     Dr. Corley was provided the recommendations above  in clinic today and Dr. Corley was available for supervision during this visit and is the authorizing prescriber for this visit through the pharmacist collaborative practice agreement.    Estephania Campos, PharmD Student    Drug therapy problems identified  1. Med: omeprazole - Indication - unnecessary drug therapy - Resolution: Change drug; resolved  2. Med: rivaroxaban - Safety - drug-procedure interaction - Resolution: Educate patient; resolved     # of medical conditions addressed: 5  # of medications addressed: 17  # of medication discrepancies identified: 3  # of DTP identified: 2  Time spent: 30 minutes  Level of service: 3NC    The student acted as scribe and the encounter documented was completely performed by myself. I have reviewed and verified the student s  documentation and found it to be correct and complete.  Mayr Andrews, Pharm.D.

## 2018-03-20 NOTE — MR AVS SNAPSHOT
After Visit Summary   3/20/2018    Eliana Bethea    MRN: [id number]           Patient Information     Date Of Birth          1940        Visit Information        Provider Department      3/20/2018 9:40 AM Moshe Corley MD [facility]        Today's Diagnoses     Abdominal pain, generalized          Care Instructions      PPIs like omeprazole have many risks if used long-term; including:  o Fractures of the hip, spine, and wrist   o Dementia  o Acute kidney failure  o Pneumonia  o  C Diff  stomach infection with severe diarrhea  o Decreased absorption of magnesium, calcium, vitamin B12, iron  o Increased risk of dying if taken long-term    If they are taken daily for a long period of time (about 2- 3 months), they can be difficult to stop because your body gets  dependent  on them.  This is why they need to gradually be decreased (tapered)  Non-drug therapy for heartburn:    Stop food/drink that can worsen heartburn: caffeine, chocolate, alcohol, peppermint, fatty foods, spicy foods, carbonated beverages    Stop smoking    Eat smaller meals    Don t eat meals 2-3 hours before bedtime    Chewing gum or sucking on lozenges (not peppermint flavor) can help    For nighttime symptoms, use a foam wedge to raise the head of the bed 6-8 inches      Visual Representation of Taper Schedule   Sunday Monday Tuesday Wednesday Thursday Friday Saturday   First 2 weeks of taper Omeprazole  20mg once daily Famotidine 20mg twice daily Omeprazole  20mg once daily Famotidine 20mg twice daily Omeprazole  20mg once daily Famotidine 20mg twice daily Omeprazole  20mg once daily   Following 2 weeks Famotidine 20mg twice daily Famotidine 20mg twice daily Famotidine 20mg twice daily Famotidine 20mg twice daily Famotidine 20mg twice daily Famotidine 20mg twice daily Famotidine 20mg twice daily   Following 2 weeks Famotidine 10 mg twice daily  Famotidine 10 mg twice daily Famotidine 10 mg twice daily Famotidine 10 mg twice  daily Famotidine 10 mg twice daily Famotidine 10 mg twice daily Famotidine 10 mg twice daily   Following 2 weeks Famotidine 10 mg once daily Famotidine 10 mg once daily Famotidine 10 mg once daily Famotidine 10 mg once daily Famotidine 10 mg once daily Famotidine 10 mg once daily Famotidine 10 mg once daily   To be continued this way, or stopped completely Famotidine 10 mg once daily as needed Famotidine 10 mg once daily as needed Famotidine 10 mg once daily as needed Famotidine 10 mg once daily as needed Famotidine 10 mg once daily as needed Famotidine 10 mg once daily as needed Famotidine 10 mg once daily as needed       For Colonoscopy:    Stop Xarelto 48 hours prior to colonoscopy  Restart within 24 hours after the colonoscopy (evening after colonoscopy is fine)          Follow-ups after your visit        Who to contact     Please call your clinic at [phone] to:    Ask questions about your health    Make or cancel appointments    Discuss your medicines    Learn about your test results    Speak to your doctor            Additional Information About Your Visit        AURSOS Information     AURSOS is an electronic gateway that provides easy, online access to your medical records. With AURSOS, you can request a clinic appointment, read your test results, renew a prescription or communicate with your care team.     To sign up for AURSOS visit the website at [email or web address]   You will be asked to enter the access code listed below, as well as some personal information. Please follow the directions to create your username and password.     Your access code is: [email or web address]  Expires: 2018  2:32 PM     Your access code will  in 90 days. If you need help or a new code, please contact your [facility] or call [phone] for assistance.        Care EveryWhere ID     This is your Care EveryWhere ID. This could be used by other organizations to access your  [facility] medical records  [id number]        Your Vitals Were     Pulse Temperature Respirations Pulse Oximetry BMI (Body Mass Index)       61 97.7  F (36.5  C) (Oral) 18 98% 32.25 kg/m2        Blood Pressure from Last 3 Encounters:   03/20/18 126/73   02/06/18 146/78   01/08/18 144/66    Weight from Last 3 Encounters:   03/20/18 258 lb (117 kg)   02/06/18 260 lb 9.6 oz (118.2 kg)   01/08/18 259 lb 2 oz (117.5 kg)              Today, you had the following     No orders found for display         Today's Medication Changes          These changes are accurate as of 3/20/18 10:33 AM.  If you have any questions, ask your nurse or doctor.               These medicines have changed or have updated prescriptions.        Dose/Directions    carvedilol 12.5 MG tablet   Commonly known as:  COREG   This may have changed:    - how much to take  - when to take this   Used for:  Chronic atrial fibrillation (H)        Dose:  12.5 mg   Take 1 tablet (12.5 mg) by mouth 2 times daily (with meals)   Quantity:  180 tablet   Refills:  11            Where to get your medicines      Some of these will need a paper prescription and others can be bought over the counter.  Ask your nurse if you have questions.     Bring a paper prescription for each of these medications     HYDROcodone-acetaminophen 5-325 MG per tablet                Primary Care Provider Office Phone # Fax #    Moshe Corley [phone]       [address] [place], [place] [id number]        Equal Access to Services     GAMALIEL EVANS : J Luis Mcallister, watonyada nancy, qaybta kaalmada adeegyada, waxay alan andrews. So [facility] [phone].    ATENCIÓN: Si habla español, tiene a flanagan disposición servicios gratuitos de asistencia lingüística. Llame al [phone].    We comply with applicable federal civil rights laws and [place] laws. We do not discriminate on the basis of race, color, national origin, age, disability, sex, sexual  orientation, or gender identity.            Thank you!     Thank you for choosing [facility]  for your care. Our goal is always to provide you with excellent care. Hearing back from our patients is one way we can continue to improve our services. Please take a few minutes to complete the written survey that you may receive in the mail after your visit with us. Thank you!             Your Updated Medication List - Protect others around you: Learn how to safely use, store and throw away your medicines at www.disposemymeds.org.          This list is accurate as of 3/20/18 10:33 AM.  Always use your most recent med list.                   Brand Name Dispense Instructions for use Diagnosis    acetaminophen 325 MG tablet    TYLENOL    60 tablet    650 mg bid as needed for  Pain NO MORE THAN 1300 mg per day    Abdominal pain, generalized       amLODIPine 2.5 MG tablet    NORVASC    60 tablet    One tab Twice a day    Essential hypertension with goal blood pressure less than 140/90       atorvastatin 20 MG tablet    LIPITOR    90 tablet    Take 1 tablet (20 mg) by mouth daily    Pure hypercholesterolemia       B-12 PO      Take 5,000 mcg by mouth daily        carvedilol 12.5 MG tablet    COREG    180 tablet    Take 1 tablet (12.5 mg) by mouth 2 times daily (with meals)    Chronic atrial fibrillation (H)       furosemide 40 MG tablet    LASIX    90 tablet    Take 2 tablets (80 mg) by mouth daily    Pedal edema       HYDROcodone-acetaminophen 5-325 MG per tablet    NORCO    59 tablet    One to two as needed for pain  maximum2 tablet(s) per day    Abdominal pain, generalized       linaclotide 145 MCG capsule    LINZESS    30 capsule    Take 1 capsule (145 mcg) by mouth every morning (before breakfast)    Slow transit constipation       lisinopril 40 MG tablet    PRINIVIL/ZESTRIL    30 tablet    Take 1 tablet (40 mg) by mouth daily Discontinue lisinopril 30 mg daily.  (dose increase)    Essential hypertension with goal  blood pressure less than 140/90       MULTIVITAMIN ADULT PO           omeprazole 20 MG CR capsule    priLOSEC    90 capsule    Take 1 capsule (20 mg) by mouth daily    Gastroesophageal reflux disease with esophagitis       propranolol 10 MG tablet    INDERAL    90 tablet    Take 1 tablet (10 mg) by mouth 2 times daily as needed    Social anxiety disorder       rivaroxaban ANTICOAGULANT 20 MG Tabs tablet    XARELTO    30 tablet    Take 1 tablet (20 mg) by mouth daily (with dinner)    Chronic atrial fibrillation (H)       spironolactone 25 MG tablet    ALDACTONE    30 tablet    Take 1 tablet (25 mg) by mouth daily    Benign essential hypertension

## 2018-03-20 NOTE — PROGRESS NOTES
S: Eliana Bethea is a 78 year old male who returns for follow up of  Abdominal pain, follows with cardiology and recently with hematology/overall assessment nor further hematology work up  Was Worked up for [facility]  Patients states that main concern today is  Med refills    PMHX/PSHX/MEDS/ALLERGIES/SHX/FHX reviewed and updated in Epic.      ROS:  General: No fevers, chills  Head: No headache  Ears: No acute change in hearing.    CV: No chest pain or palpitations.  Resp: No shortness of breath.  No cough. No hemoptysis.  GI: No nausea, vomiting, constipation, diarrhea  : No urinary pains    O: /73  Pulse 61  Temp 97.7  F (36.5  C) (Oral)  Resp 18  Wt 258 lb (117 kg)  SpO2 98%  BMI 32.25 kg/m2   Gen:  Well nourished and in NAD    Neck: supple without lymphadenopathy  CV:  R RRR  - no murmurs, rubs, or gallups,   Pulm:  CTAB, no wheezes/rales/rhonchi, good air entry   ABD: soft, nontender, no masses, no rebound, BS intact throughout  Extrem: no cyanosis, edema or clubbing  Psych: Euthymic     1. Stable  Afib/on NOAC    2 CHF/HTN: stable in target   Pharmacy reconciliation  BMP; normal 3/9/18  3 Diverticulosis : follow with GI/will have colonoscopy soon, counseled about stopping ans starting anticoagulation  Ok to refill 1 to 2 Vicodin  Per day  Hematology input: [facility]/ just monitor on yearly basis    RTC I routine medical care or sooner if develops new or worsening symptoms.    Moshe Corley

## 2018-03-20 NOTE — PATIENT INSTRUCTIONS
PPIs like omeprazole have many risks if used long-term; including:  o Fractures of the hip, spine, and wrist   o Dementia  o Acute kidney failure  o Pneumonia  o  C Diff  stomach infection with severe diarrhea  o Decreased absorption of magnesium, calcium, vitamin B12, iron  o Increased risk of dying if taken long-term    If they are taken daily for a long period of time (about 2- 3 months), they can be difficult to stop because your body gets  dependent  on them.  This is why they need to gradually be decreased (tapered)  Non-drug therapy for heartburn:    Stop food/drink that can worsen heartburn: caffeine, chocolate, alcohol, peppermint, fatty foods, spicy foods, carbonated beverages    Stop smoking    Eat smaller meals    Don t eat meals 2-3 hours before bedtime    Chewing gum or sucking on lozenges (not peppermint flavor) can help    For nighttime symptoms, use a foam wedge to raise the head of the bed 6-8 inches      Visual Representation of Taper Schedule   Sunday Monday Tuesday Wednesday Thursday Friday Saturday   First 2 weeks of taper Omeprazole  20mg once daily Famotidine 20mg twice daily Omeprazole  20mg once daily Famotidine 20mg twice daily Omeprazole  20mg once daily Famotidine 20mg twice daily Omeprazole  20mg once daily   Following 2 weeks Famotidine 20mg twice daily Famotidine 20mg twice daily Famotidine 20mg twice daily Famotidine 20mg twice daily Famotidine 20mg twice daily Famotidine 20mg twice daily Famotidine 20mg twice daily   Following 2 weeks Famotidine 10 mg twice daily  Famotidine 10 mg twice daily Famotidine 10 mg twice daily Famotidine 10 mg twice daily Famotidine 10 mg twice daily Famotidine 10 mg twice daily Famotidine 10 mg twice daily   Following 2 weeks Famotidine 10 mg once daily Famotidine 10 mg once daily Famotidine 10 mg once daily Famotidine 10 mg once daily Famotidine 10 mg once daily Famotidine 10 mg once daily Famotidine 10 mg once daily   To be continued this way, or  stopped completely Famotidine 10 mg once daily as needed Famotidine 10 mg once daily as needed Famotidine 10 mg once daily as needed Famotidine 10 mg once daily as needed Famotidine 10 mg once daily as needed Famotidine 10 mg once daily as needed Famotidine 10 mg once daily as needed       For Colonoscopy:    Stop Xarelto 48 hours prior to colonoscopy  Restart within 24 hours after the colonoscopy (evening after colonoscopy is fine)

## 2018-04-23 NOTE — MR AVS SNAPSHOT
After Visit Summary   2018    Eliana Bethea    MRN: [id number]           Patient Information     Date Of Birth          1940        Visit Information        Provider Department      2018 1:10 PM Moshe Corley MD [facility]        Today's Diagnoses     Abdominal pain, generalized          Care Instructions    I will refill your Vicodin    Your Colonoscopy was normal    I want you to follow up with GI for your Diverticulitis    Return to clinic to see me in 4-6 weeks          Follow-ups after your visit        Who to contact     Please call your clinic at [phone] to:    Ask questions about your health    Make or cancel appointments    Discuss your medicines    Learn about your test results    Speak to your doctor            Additional Information About Your Visit        MyChart Information     Loted[employer] is an electronic gateway that provides easy, online access to your medical records. With [employer], you can request a clinic appointment, read your test results, renew a prescription or communicate with your care team.     To sign up for [employer] visit the website at [email or web address]   You will be asked to enter the access code listed below, as well as some personal information. Please follow the directions to create your username and password.     Your access code is: [email or web address]  Expires: 2018  2:32 PM     Your access code will  in 90 days. If you need help or a new code, please contact your [facility] or call [phone] for assistance.        Care EveryWhere ID     This is your Care EveryWhere ID. This could be used by other organizations to access your [facility] medical records  [id number]        Your Vitals Were     Pulse Temperature Respirations Pulse Oximetry BMI (Body Mass Index)       63 97.7  F (36.5  C) (Oral) 16 97% 33.12 kg/m2        Blood Pressure from Last 3 Encounters:   18 135/81   18 126/73    02/06/18 146/78    Weight from Last 3 Encounters:   04/23/18 265 lb (120.2 kg)   03/20/18 258 lb (117 kg)   02/06/18 260 lb 9.6 oz (118.2 kg)              Today, you had the following     No orders found for display         Today's Medication Changes          These changes are accurate as of 4/23/18  1:37 PM.  If you have any questions, ask your nurse or doctor.               These medicines have changed or have updated prescriptions.        Dose/Directions    carvedilol 12.5 MG tablet   Commonly known as:  COREG   This may have changed:    - how much to take  - when to take this   Used for:  Chronic atrial fibrillation (H)        Dose:  12.5 mg   Take 1 tablet (12.5 mg) by mouth 2 times daily (with meals)   Quantity:  180 tablet   Refills:  11            Where to get your medicines      Some of these will need a paper prescription and others can be bought over the counter.  Ask your nurse if you have questions.     Bring a paper prescription for each of these medications     HYDROcodone-acetaminophen 5-325 MG per tablet               Information about OPIOIDS     PRESCRIPTION OPIOIDS: WHAT YOU NEED TO KNOW   You have a prescription for an opioid (narcotic) pain medicine. Opioids can cause addiction. If you have a history of chemical dependency of any type, you are at a higher risk of becoming addicted to opioids. Only take this medicine after all other options have been tried. Take it for as short a time and as few doses as possible.     Do not:    Drive. If you drive while taking these medicines, you could be arrested for driving under the influence (DUI).    Operate heavy machinery    Do any other dangerous activities while taking these medicines.     Drink any alcohol while taking these medicines.      Take with any other medicines that contain acetaminophen. Read all labels carefully. Look for the word  acetaminophen  or  Tylenol.  Ask your pharmacist if you have questions or are unsure.    Store your pills  in a secure place, locked if possible. We will not replace any lost or stolen medicine. If you don t finish your medicine, please throw away (dispose) as directed by your pharmacist. The [place] Pollution Control Agency has more information about safe disposal: https://www.pca.[facility].mn.us/living-green/managing-unwanted-medications    All opioids tend to cause constipation. Drink plenty of water and eat foods that have a lot of fiber, such as fruits, vegetables, prune juice, apple juice and high-fiber cereal. Take a laxative (Miralax, milk of magnesia, Colace, Senna) if you don t move your bowels at least every other day.          Primary Care Provider Office Phone # Fax #    Moshe Corley [phone]       [address] [place] [address]        Equal Access to Services     GAMALIEL EVANS : J Luis Mcallister, waaxalycia cruz, qaybjacquelin kaalanthony beth, dale rainey . So [facility] [phone].    ATENCIÓN: Si habla español, tiene a flanagan disposición servicios gratuitos de asistencia lingüística. Riri al [phone].    We comply with applicable federal civil rights laws and [place] laws. We do not discriminate on the basis of race, color, national origin, age, disability, sex, sexual orientation, or gender identity.            Thank you!     Thank you for choosing [facility]  for your care. Our goal is always to provide you with excellent care. Hearing back from our patients is one way we can continue to improve our services. Please take a few minutes to complete the written survey that you may receive in the mail after your visit with us. Thank you!             Your Updated Medication List - Protect others around you: Learn how to safely use, store and throw away your medicines at www.disposemymeds.org.          This list is accurate as of 4/23/18  1:37 PM.  Always use your most recent med list.                   Brand Name Dispense Instructions for use  Diagnosis    acetaminophen 325 MG tablet    TYLENOL    60 tablet    650 mg bid as needed for  Pain NO MORE THAN 1300 mg per day    Abdominal pain, generalized       amLODIPine 2.5 MG tablet    NORVASC    180 tablet    One tab Twice a day    Essential hypertension with goal blood pressure less than 140/90       atorvastatin 20 MG tablet    LIPITOR    90 tablet    Take 1 tablet (20 mg) by mouth daily    Pure hypercholesterolemia       B-12 PO      Take 5,000 mcg by mouth daily        carvedilol 12.5 MG tablet    COREG    180 tablet    Take 1 tablet (12.5 mg) by mouth 2 times daily (with meals)    Chronic atrial fibrillation (H)       famotidine 20 MG tablet    PEPCID    180 tablet    Use as directed while tapering off omeprazole, up to 1 tab BID    Gastroesophageal reflux disease, esophagitis presence not specified       furosemide 40 MG tablet    LASIX    90 tablet    Take 2 tablets (80 mg) by mouth daily    Pedal edema       HYDROcodone-acetaminophen 5-325 MG per tablet    NORCO    59 tablet    One to two as needed for pain  maximum2 tablet(s) per day    Abdominal pain, generalized       linaclotide 145 MCG capsule    LINZESS    30 capsule    Take 1 capsule (145 mcg) by mouth every morning (before breakfast)    Slow transit constipation       lisinopril 40 MG tablet    PRINIVIL/ZESTRIL    90 tablet    Take 1 tablet (40 mg) by mouth daily    Essential hypertension with goal blood pressure less than 140/90       MULTIVITAMIN ADULT PO           omeprazole 20 MG CR capsule    priLOSEC    90 capsule    Take 1 capsule (20 mg) by mouth daily    Gastroesophageal reflux disease with esophagitis       propranolol 10 MG tablet    INDERAL    90 tablet    Take 1 tablet (10 mg) by mouth 2 times daily as needed    Social anxiety disorder       rivaroxaban ANTICOAGULANT 20 MG Tabs tablet    XARELTO    30 tablet    Take 1 tablet (20 mg) by mouth daily (with dinner)    Chronic atrial fibrillation (H)       spironolactone 25 MG tablet     ALDACTONE    90 tablet    Take 1 tablet (25 mg) by mouth daily    Benign essential hypertension

## 2018-04-23 NOTE — PATIENT INSTRUCTIONS
I will refill your Vicodin    Your Colonoscopy was normal    I want you to follow up with GI for your Diverticulitis    Return to clinic to see me in 4-6 weeks

## 2018-04-23 NOTE — PROGRESS NOTES
S: Eliana Bethea is a 78 year old male who returns for follow up of  Abdominal pain/diverticulosis, using occasional vicoden  Patients states that main concern today is abdominal pain    PMHX/PSHX/MEDS/ALLERGIES/SHX/FHX reviewed and updated in Epic.      ROS:  General: No fevers, chills  Head: No headache  Ears: No acute change in hearing.    CV: No chest pain or palpitations.  Resp: No shortness of breath.  No cough. No hemoptysis.  GI: No nausea, vomiting, constipation, diarrhea  : No urinary pains    O: /81  Pulse 63  Temp 97.7  F (36.5  C) (Oral)  Resp 16  Wt 265 lb (120.2 kg)  SpO2 97%  BMI 33.12 kg/m2   Gen:  Well nourished and in NAD    CV:  IRR  - no murmurs, rubs, or gallups,   Pulm:  CTAB, no wheezes/rales/rhonchi, good air entry   ABD: soft, nontender, no masses, no rebound, BS intact throughout  Extrem: no cyanosis, or clubbing, edema minimal   Psych: Euthymic      (R10.84) Abdominal pain, generalized  Comment: Recent colonoscopy diffuse diverticulosis but not diverticulitis, bowel care per GI  Plan: HYDROcodone-acetaminophen (NORCO) 5-325 MG per         tablet   sparingly    HTN in target, no med cahnges    RTC in 3 yo 6 weeks, for follow up of Diverticulosis or sooner if develops new or worsening symptoms.    Moshe Corley

## 2018-06-05 NOTE — PROGRESS NOTES
S: Eliana Bethea is a 78 year old male who returns for follow up of abdominal pain/colitis Recent colonoscopy shows that ha extensive diverticulosis.  He has diverticuloses care prescribes by  GI    Patients states that main concern today is some pain medications for acute bouts of abdominal pain    PMHX/PSHX/MEDS/ALLERGIES/SHX/FHX reviewed and updated in Epic.      ROS:  General: No fevers, chills  Head: No headache  Ears: No acute change in hearing.    CV: No chest pain or palpitations.  Resp: No shortness of breath.  No cough. No hemoptysis.  GI: No nausea, vomiting, constipation, diarrhea  : No urinary pains    O: /73  Pulse 61  Temp 97.8  F (36.6  C) (Oral)  Resp 16  Wt 256 lb (116.1 kg)  SpO2 100%  BMI 32 kg/m2   Gen:  Well nourished and in NAD    Neck: supple without lymphadenopathy  CV:  RRR  - no murmurs, rubs, or gallups,   Pulm:  CTAB, no wheezes/rales/rhonchi, good air entry   ABD: soft, nontender, no masses, no rebound, BS intact throughout  Extrem: no cyanosis, edema or clubbing  Psych: Euthymic      (R10.84) Abdominal pain, generalized Diverticulosis  Comment:at base line  Plan: HYDROcodone-acetaminophen (NORCO) 5-325 MG per         Tablet one to 2 /no more than two per day as needed       Continue diverticulosis regimen        RTC [id number], for follow up of abdominal pain or sooner if develops new or worsening symptoms.    Moshe Corley

## 2018-06-05 NOTE — MR AVS SNAPSHOT
After Visit Summary   2018    Eliana Bethea    MRN: [id number]           Patient Information     Date Of Birth          1940        Visit Information        Provider Department      2018 9:20 AM Moshe Corley MD [facility]        Today's Diagnoses     Abdominal pain, generalized           Follow-ups after your visit        Who to contact     Please call your clinic at [phone] to:    Ask questions about your health    Make or cancel appointments    Discuss your medicines    Learn about your test results    Speak to your doctor            Additional Information About Your Visit        MyChart Information     Sgr[employer]ples is an electronic gateway that provides easy, online access to your medical records. With Sgrouples, you can request a clinic appointment, read your test results, renew a prescription or communicate with your care team.     To sign up for Sgrouples visit the website at [email or web address]   You will be asked to enter the access code listed below, as well as some personal information. Please follow the directions to create your username and password.     Your access code is: [email or web address]  Expires: 2018  8:38 AM     Your access code will  in 90 days. If you need help or a new code, please contact your [facility] or call [phone] for assistance.        Care EveryWhere ID     This is your Care EveryWhere ID. This could be used by other organizations to access your [place] medical records  [id number]        Your Vitals Were     Pulse Temperature Respirations Pulse Oximetry BMI (Body Mass Index)       61 97.8  F (36.6  C) (Oral) 16 100% 32 kg/m2        Blood Pressure from Last 3 Encounters:   18 143/73   18 135/81   18 126/73    Weight from Last 3 Encounters:   18 256 lb (116.1 kg)   18 265 lb (120.2 kg)   18 258 lb (117 kg)              Today, you had the following     No orders found for  display         Today's Medication Changes          These changes are accurate as of 6/5/18 11:59 PM.  If you have any questions, ask your nurse or doctor.               These medicines have changed or have updated prescriptions.        Dose/Directions    carvedilol 12.5 MG tablet   Commonly known as:  COREG   This may have changed:    - how much to take  - when to take this   Used for:  Chronic atrial fibrillation (H)        Dose:  12.5 mg   Take 1 tablet (12.5 mg) by mouth 2 times daily (with meals)   Quantity:  180 tablet   Refills:  11            Where to get your medicines      Some of these will need a paper prescription and others can be bought over the counter.  Ask your nurse if you have questions.     Bring a paper prescription for each of these medications     HYDROcodone-acetaminophen 5-325 MG per tablet               Information about OPIOIDS     PRESCRIPTION OPIOIDS: WHAT YOU NEED TO KNOW   You have a prescription for an opioid (narcotic) pain medicine. Opioids can cause addiction. If you have a history of chemical dependency of any type, you are at a higher risk of becoming addicted to opioids. Only take this medicine after all other options have been tried. Take it for as short a time and as few doses as possible.     Do not:    Drive. If you drive while taking these medicines, you could be arrested for driving under the influence (DUI).    Operate heavy machinery    Do any other dangerous activities while taking these medicines.     Drink any alcohol while taking these medicines.      Take with any other medicines that contain acetaminophen. Read all labels carefully. Look for the word  acetaminophen  or  Tylenol.  Ask your pharmacist if you have questions or are unsure.    Store your pills in a secure place, locked if possible. We will not replace any lost or stolen medicine. If you don t finish your medicine, please throw away (dispose) as directed by your pharmacist. The [facility]  Control Agency has more information about safe disposal: https://www.pca.[facility].us/living-green/managing-unwanted-medications    All opioids tend to cause constipation. Drink plenty of water and eat foods that have a lot of fiber, such as fruits, vegetables, prune juice, apple juice and high-fiber cereal. Take a laxative (Miralax, milk of magnesia, Colace, Senna) if you don t move your bowels at least every other day.          Primary Care Provider Office Phone # Fax #    Moshe Corley [phone] [address] [facility] [address]        Equal Access to Services     [facility] : Hadii amairani Mcallister, enma cruz, nydia beth, dale rainey . So [facility] [phone].    ATENCIÓN: Si habla español, tiene a flanagan disposición servicios gratuitos de asistencia lingüística. [facility] [phone].    We comply with applicable federal civil rights laws and [place] laws. We do not discriminate on the basis of race, color, national origin, age, disability, sex, sexual orientation, or gender identity.            Thank you!     Thank you for choosing [facility]  for your care. Our goal is always to provide you with excellent care. Hearing back from our patients is one way we can continue to improve our services. Please take a few minutes to complete the written survey that you may receive in the mail after your visit with us. Thank you!             Your Updated Medication List - Protect others around you: Learn how to safely use, store and throw away your medicines at www.disposemymeds.org.          This list is accurate as of 6/5/18 11:59 PM.  Always use your most recent med list.                   Brand Name Dispense Instructions for use Diagnosis    acetaminophen 325 MG tablet    TYLENOL    60 tablet    650 mg bid as needed for  Pain NO MORE THAN 1300 mg per day    Abdominal pain, generalized       amLODIPine 2.5 MG tablet    NORVASC    180 tablet     One tab Twice a day    Essential hypertension with goal blood pressure less than 140/90       atorvastatin 20 MG tablet    LIPITOR    90 tablet    Take 1 tablet (20 mg) by mouth daily    Pure hypercholesterolemia       B-12 PO      Take 5,000 mcg by mouth daily        carvedilol 12.5 MG tablet    COREG    180 tablet    Take 1 tablet (12.5 mg) by mouth 2 times daily (with meals)    Chronic atrial fibrillation (H)       famotidine 20 MG tablet    PEPCID    180 tablet    Use as directed while tapering off omeprazole, up to 1 tab BID    Gastroesophageal reflux disease, esophagitis presence not specified       furosemide 40 MG tablet    LASIX    90 tablet    Take 2 tablets (80 mg) by mouth daily    Pedal edema       HYDROcodone-acetaminophen 5-325 MG per tablet    NORCO    59 tablet    One to two as needed for pain  maximum2 tablet(s) per day    Abdominal pain, generalized       linaclotide 145 MCG capsule    LINZESS    30 capsule    Take 1 capsule (145 mcg) by mouth every morning (before breakfast)    Slow transit constipation       lisinopril 40 MG tablet    PRINIVIL/ZESTRIL    90 tablet    Take 1 tablet (40 mg) by mouth daily    Essential hypertension with goal blood pressure less than 140/90       MULTIVITAMIN ADULT PO           omeprazole 20 MG CR capsule    priLOSEC    90 capsule    Take 1 capsule (20 mg) by mouth daily    Gastroesophageal reflux disease with esophagitis       propranolol 10 MG tablet    INDERAL    90 tablet    Take 1 tablet (10 mg) by mouth 2 times daily as needed    Social anxiety disorder       rivaroxaban ANTICOAGULANT 20 MG Tabs tablet    XARELTO    30 tablet    Take 1 tablet (20 mg) by mouth daily (with dinner)    Chronic atrial fibrillation (H)       spironolactone 25 MG tablet    ALDACTONE    90 tablet    Take 1 tablet (25 mg) by mouth daily    Benign essential hypertension

## 2018-07-10 NOTE — MR AVS SNAPSHOT
After Visit Summary   7/10/2018    Eliana Bethea    MRN: [id number]           Patient Information     Date Of Birth          1940        Visit Information        Provider Department      7/10/2018 9:40 AM Cheikh Hayes MD [facility]        Today's Diagnoses     Benign hypertension    -  1    Abdominal pain, generalized           Follow-ups after your visit        Follow-up notes from your care team     Return in about 4 weeks (around 2018).      Who to contact     Please call your clinic at [phone] to:    Ask questions about your health    Make or cancel appointments    Discuss your medicines    Learn about your test results    Speak to your doctor            Additional Information About Your Visit        MyChart Information     ShareWithUhart is an electronic gateway that provides easy, online access to your medical records. With RAM[employer] Sport[employer], you can request a clinic appointment, read your test results, renew a prescription or communicate with your care team.     To sign up for RAM[employer] Sportst visit the website at [email or web address]   You will be asked to enter the access code listed below, as well as some personal information. Please follow the directions to create your username and password.     Your access code is: [email or web address]  Expires: 2018  8:38 AM     Your access code will  in 90 days. If you need help or a new code, please contact your [facility] or call [phone] for assistance.        Care EveryWhere ID     This is your Care EveryWhere ID. This could be used by other organizations to access your [place] medical records  [id number]        Your Vitals Were     Pulse Temperature Respirations Pulse Oximetry BMI (Body Mass Index)       69 97.7  F (36.5  C) (Oral) 16 100% 31.25 kg/m2        Blood Pressure from Last 3 Encounters:   07/10/18 113/64   18 143/73   18 135/81    Weight from Last 3 Encounters:   07/10/18 250 lb (113.4  kg)   06/05/18 256 lb (116.1 kg)   04/23/18 265 lb (120.2 kg)              Today, you had the following     No orders found for display         Today's Medication Changes          These changes are accurate as of 7/10/18 11:59 PM.  If you have any questions, ask your nurse or doctor.               These medicines have changed or have updated prescriptions.        Dose/Directions    carvedilol 12.5 MG tablet   Commonly known as:  COREG   This may have changed:    - how much to take  - when to take this   Used for:  Chronic atrial fibrillation (H)        Dose:  12.5 mg   Take 1 tablet (12.5 mg) by mouth 2 times daily (with meals)   Quantity:  180 tablet   Refills:  11            Where to get your medicines      Some of these will need a paper prescription and others can be bought over the counter.  Ask your nurse if you have questions.     Bring a paper prescription for each of these medications     HYDROcodone-acetaminophen 5-325 MG per tablet               Information about OPIOIDS     PRESCRIPTION OPIOIDS: WHAT YOU NEED TO KNOW   We gave you an opioid (narcotic) pain medicine. It is important to manage your pain, but opioids are not always the best choice. You should first try all the other options your care team gave you. Take this medicine for as short a time (and as few doses) as possible.     These medicines have risks:    DO NOT drive when on new or higher doses of pain medicine. These medicines can affect your alertness and reaction times, and you could be arrested for driving under the influence (DUI). If you need to use opioids long-term, talk to your care team about driving.    DO NOT operate heave machinery    DO NOT do any other dangerous activities while taking these medicines.     DO NOT drink any alcohol while taking these medicines.      If the opioid prescribed includes acetaminophen, DO NOT take with any other medicines that contain acetaminophen. Read all labels carefully. Look for the word   acetaminophen  or  Tylenol.  Ask your pharmacist if you have questions or are unsure.    You can get addicted to pain medicines, especially if you have a history of addiction (chemical, alcohol or substance dependence). Talk to your care team about ways to reduce this risk.    Store your pills in a secure place, locked if possible. We will not replace any lost or stolen medicine. If you don t finish your medicine, please throw away (dispose) as directed by your pharmacist. The [place] Pollution Control Agency has more information about safe disposal: https://www.pca.[facility].[place].us/living-green/managing-unwanted-medications.     All opioids tend to cause constipation. Drink plenty of water and eat foods that have a lot of fiber, such as fruits, vegetables, prune juice, apple juice and high-fiber cereal. Take a laxative (Miralax, milk of magnesia, Colace, Senna) if you don t move your bowels at least every other day.          Primary Care Provider Office Phone # Fax #    Moshe Corley [phone]       [address] [place] [address]        Equal Access to Services     [facility] NATHAN : Hadii amairani sanchez Sojere, waaxda luqadaha, qaybta kaalmaalycia beth, dale andrews. So [facility] [phone].    ATENCIÓN: Si habla español, tiene a flanagan disposición servicios gratuitos de asistencia lingüística. Riri al [phone].    We comply with applicable federal civil rights laws and [place] laws. We do not discriminate on the basis of race, color, national origin, age, disability, sex, sexual orientation, or gender identity.            Thank you!     Thank you for choosing [facility]  for your care. Our goal is always to provide you with excellent care. Hearing back from our patients is one way we can continue to improve our services. Please take a few minutes to complete the written survey that you may receive in the mail after your visit with us. Thank you!             Your  Updated Medication List - Protect others around you: Learn how to safely use, store and throw away your medicines at www.disposemymeds.org.          This list is accurate as of 7/10/18 11:59 PM.  Always use your most recent med list.                   Brand Name Dispense Instructions for use Diagnosis    acetaminophen 325 MG tablet    TYLENOL    60 tablet    650 mg bid as needed for  Pain NO MORE THAN 1300 mg per day    Abdominal pain, generalized       amLODIPine 2.5 MG tablet    NORVASC    180 tablet    One tab Twice a day    Essential hypertension with goal blood pressure less than 140/90       atorvastatin 20 MG tablet    LIPITOR    90 tablet    Take 1 tablet (20 mg) by mouth daily    Pure hypercholesterolemia       B-12 PO      Take 5,000 mcg by mouth daily        carvedilol 12.5 MG tablet    COREG    180 tablet    Take 1 tablet (12.5 mg) by mouth 2 times daily (with meals)    Chronic atrial fibrillation (H)       famotidine 20 MG tablet    PEPCID    180 tablet    Use as directed while tapering off omeprazole, up to 1 tab BID    Gastroesophageal reflux disease, esophagitis presence not specified       furosemide 40 MG tablet    LASIX    90 tablet    Take 2 tablets (80 mg) by mouth daily    Pedal edema       HYDROcodone-acetaminophen 5-325 MG per tablet    NORCO    59 tablet    One to two as needed for pain  maximum2 tablet(s) per day    Abdominal pain, generalized       linaclotide 145 MCG capsule    LINZESS    30 capsule    Take 1 capsule (145 mcg) by mouth every morning (before breakfast)    Slow transit constipation       lisinopril 40 MG tablet    PRINIVIL/ZESTRIL    90 tablet    Take 1 tablet (40 mg) by mouth daily    Essential hypertension with goal blood pressure less than 140/90       MULTIVITAMIN ADULT PO           omeprazole 20 MG CR capsule    priLOSEC    90 capsule    Take 1 capsule (20 mg) by mouth daily    Gastroesophageal reflux disease with esophagitis       propranolol 10 MG tablet    INDERAL     90 tablet    Take 1 tablet (10 mg) by mouth 2 times daily as needed    Social anxiety disorder       rivaroxaban ANTICOAGULANT 20 MG Tabs tablet    XARELTO    30 tablet    Take 1 tablet (20 mg) by mouth daily (with dinner)    Chronic atrial fibrillation (H)       spironolactone 25 MG tablet    ALDACTONE    90 tablet    Take 1 tablet (25 mg) by mouth daily    Benign essential hypertension

## 2018-07-10 NOTE — PROGRESS NOTES
HPI       Eliana Bethea is a 78 year old  who presents for hypertension follow up and refill request of pain medication for diverticuulosis.  Chief Complaint   Patient presents with     Hypertension     Refill Request     on pain med that he takes for his diverticulitis     He takes BP medications daily (amlodipine 2.5 mg twice daily, lisinopril 40 mg daily, propranolol 10 mg twice daily and carvedilol 12.5 mg twice daily) and has no complaints. Does not check BP at home. He has not experienced any episodes of low blood pressure. He is continuing to be active and has lost 6 pounds since his last visit in June.    He has a history of diverticulosis that was confirmed on 6/29 with colonoscopy. He is still experiencing pain. Describes it as a 4 out of 10 when it is managed with Vicodin. It as a throbbing pain on the right side and on the left side just lateral and deep to the umbilicus that can radiate to the hip. No pain with defecation. Says the vicodin gets him through day and night. Takes 1 tablet in the day and 1 before bed.  If he is not on his pain medication he can experience periods of pain that brings him to his knees. He will also sweat profusely. With the use of Vicodin, he feels his activities of daily life has greatly improved. 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 He recently sold his car so it would force him to walk more.    Adherence and Exercise  Medication side effects: no  How often is a medication missed? Never  Exercise:walking  and biking 6-7 days/week    Problem, Medication and Allergy Lists were reviewed and updated if needed..    Patient is an established patient of this clinic..         Review of Systems:   Review of Systems   Constitutional: Negative for activity change, appetite change, fatigue and fever.   HENT: Negative for hearing loss and sore throat.    Eyes: Negative for visual disturbance.            Physical Exam:     Vitals:    07/10/18 0853    BP: 113/64   Pulse: 69   Resp: 16   Temp: 97.7  F (36.5  C)   TempSrc: Oral   SpO2: 100%   Weight: 250 lb (113.4 kg)     Body mass index is 31.25 kg/(m^2).  Vitals were reviewed and were normal     Physical Exam   Constitutional: He is oriented to person, place, and time.   Cardiovascular: Normal rate, regular rhythm and normal heart sounds.  Exam reveals no gallop and no friction rub.    No murmur heard.  Pulmonary/Chest: Effort normal and breath sounds normal. No respiratory distress. He has no wheezes. He has no rales.   Abdominal: Bowel sounds are normal.   Musculoskeletal: He exhibits no edema.   Neurological: He is alert and oriented to person, place, and time.   Psychiatric: He has a normal mood and affect. His behavior is normal. Thought content normal.         Results:   No testing ordered today    Assessment and Gamaliel Monroy was seen today for hypertension and refill request.    Diagnoses and all orders for this visit:    Hypertension follow-up. He has no concerns with his medications or adverse reactions. He is compliant with all of his medications. He is exercising daily by biking and walking. His BP today is 113/64 and he has lost 6 pounds since his last visit in June.   - Continue management with current medications    Abdominal pain, generalized. On 6/29 he had a colonoscopy done to confirm diagnosis of diverticulosis. He is still experiencing pain. If he is not on his pain medication he can experience periods of pain that brings him to his knees. His pain is better managed while on Vicodin and describes it as a 4 out of 10. He takes 1 tablet during the day and 1 at night. With the use of Vicodin, he feels his activities of daily life has greatly improved. 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   -     HYDROcodone-acetaminophen (NORCO) 5-325 MG per tablet; One to two as needed for pain   maximum2 tablet(s) per day  - Consider further imaging to rule out any  structural causes of pain  - CPM for chronic pain management on opioids                Medications Discontinued During This Encounter   Medication Reason     HYDROcodone-acetaminophen (NORCO) 5-325 MG per tablet Reorder     Eric Cali, MS3    Options for treatment and follow-up care were reviewed with the patient. Eliana Bethea  engaged in the decision making process and verbalized understanding of the options discussed and agreed with the final plan.    Cheikh Haeys MD

## 2018-07-10 NOTE — PROGRESS NOTES
The medical student acted as a scribe and the encounter documented above was performed completely by me and the documentation accurately reflects the work I have performed today. Cheikh Hayes MD

## 2018-07-22 NOTE — NURSING NOTE
Don't review the medication today because patient doesn't recall the name of the med he is taking.    (2) very limited

## 2018-08-03 NOTE — TELEPHONE ENCOUNTER
Pharmacy sent a Rx for Silver Sulfadiazine 1% cream 85 GM but it is not on patients medication list please advice thank you

## 2018-08-08 PROBLEM — L30.9 DERMATITIS: Status: ACTIVE | Noted: 2018-01-01

## 2018-08-08 NOTE — PROGRESS NOTES
S: Eliana Bethea is a 78 year old male who returns for follow up of  Diverticulosis. He is following bowel care and diverticulosis medications. Hehas seen the GI doctor with a recent colonoscopy. No findings beyond diverticulosis. Current plan is to continue bowel care with no further colonoscopies. He continues to have abdominal pain and distention. At times, he takes 1-2 tablets of Vicodin when pain is unbearable.    He also complains for burning in his stomach. He has taken Prilosec and H2 blocker in the past. He is currently no taking anything.    He was seen recently by cardiology. Appears to be stable with all heart issues, but he is duefor battery replacement for his pacemaker.     He continues to have leg ulcers. The most recent on his left leg. This is now chronic. He has been putting SSD and dressings, but it has no healed. He was seen by IR for PVD 3 years ago. At that time, he was still alright.     Patients states that main concern today is leg ulcer and abdominal pain.    PMHX/PSHX/MEDS/ALLERGIES/SHX/FHX reviewed and updated in Epic.      ROS:  General: No fevers, chills  Head: No headache  Ears: No acute change in hearing.    CV: No chest pain or palpitations.  Resp: No shortness of breath.  No cough. No hemoptysis.  GI: No nausea, vomiting, constipation, diarrhea  : No urinary pains    O: /63  Pulse 74  Temp 98  F (36.7  C) (Oral)  Resp 16  Wt 244 lb (110.7 kg)  SpO2 98%  BMI 30.5 kg/m2   Gen:  Well nourished and in NAD    Neck: supple without lymphadenopathy  CV:  IRR  - no murmurs, rubs, or gallups,   Pulm:  CTAB, no wheezes/rales/rhonchi, good air entry   ABD: soft, nontender, no masses, no rebound, BS intact throughout  Extrem: no cyanosis, or clubbing,   Leg edema bilaterally  3x2 cm ulcer -- distal/medial left lower leg, likely grade 3.  Psych: Euthymic      1 DIVERTICULOSIS   BOWEL CARE   OK for prn antonia -- if worsens, follow up with GI  2 leg leg ulcer/ wound clinic-- may need to  have PDA eval  3 Afib: stable, will follow up with cardiology regarding pacemaker  4 Gastritisis - Discussed PPI vs H2 blocker. Recommended H2 blocker only, patient agreed and has at home.  5. Medications reconciled with removal of propranolol (beta blocker), omeprazole (PPI), and SSD from the medication list  .    RTC in 4 weeks, for follow up of  Leg ulcers or sooner if develops new or worsening symptoms.    Moshe Corley

## 2018-08-08 NOTE — MR AVS SNAPSHOT
After Visit Summary   8/8/2018    Eliana Bethea    MRN: [id number]           Patient Information     Date Of Birth          1940        Visit Information        Provider Department      8/8/2018 8:40 AM Moshe Corley MD [facility]        Today's Diagnoses     Diverticulitis of colon    -  1    Ulcer of lower limb, left, with unspecified severity (H)        Bilateral leg edema        Dermatitis          Care Instructions    Ok for few vicoden   continue bowel care   wound care referral             Follow-ups after your visit        Additional Services     WOUND CARE REFERRAL       Left lower leg ulcer for several weeks                  Follow-up notes from your care team     Return in about 4 weeks (around 9/5/2018).      Future tests that were ordered for you today     Open Future Orders        Priority Expected Expires Ordered    WOUND CARE REFERRAL Routine  8/15/2018 8/8/2018            Who to contact     Please call your clinic at [phone] to:    Ask questions about your health    Make or cancel appointments    Discuss your medicines    Learn about your test results    Speak to your doctor            Additional Information About Your Visit        Care EveryWhere ID     This is your Care EveryWhere ID. This could be used by other organizations to access your [facility] medical records  [id number]        Your Vitals Were     Pulse Temperature Respirations Pulse Oximetry BMI (Body Mass Index)       74 98  F (36.7  C) (Oral) 16 98% 30.5 kg/m2        Blood Pressure from Last 3 Encounters:   08/08/18 134/63   07/10/18 113/64   06/05/18 143/73    Weight from Last 3 Encounters:   08/08/18 244 lb (110.7 kg)   07/10/18 250 lb (113.4 kg)   06/05/18 256 lb (116.1 kg)                 Today's Medication Changes          These changes are accurate as of 8/8/18  9:05 AM.  If you have any questions, ask your nurse or doctor.               These medicines have changed or have updated prescriptions.         Dose/Directions    carvedilol 12.5 MG tablet   Commonly known as:  COREG   This may have changed:    - how much to take  - when to take this   Used for:  Chronic atrial fibrillation (H)        Dose:  12.5 mg   Take 1 tablet (12.5 mg) by mouth 2 times daily (with meals)   Quantity:  180 tablet   Refills:  11       * HYDROcodone-acetaminophen 5-325 MG per tablet   Commonly known as:  NORCO   This may have changed:  Another medication with the same name was added. Make sure you understand how and when to take each.   Used for:  Abdominal pain, generalized   Changed by:  Moshe Corley MD        One to two as needed for pain  maximum2 tablet(s) per day   Quantity:  59 tablet   Refills:  0       * HYDROcodone-acetaminophen 5-325 MG per tablet   Commonly known as:  NORCO   This may have changed:  You were already taking a medication with the same name, and this prescription was added. Make sure you understand how and when to take each.   Used for:  Diverticulitis of colon   Changed by:  Moshe Corley MD        One to two per day no more than 2 per day/   Quantity:  59 tablet   Refills:  0       * Notice:  This list has 2 medication(s) that are the same as other medications prescribed for you. Read the directions carefully, and ask your doctor or other care provider to review them with you.         Where to get your medicines      These medications were sent to [id number] - [place], [place] - [address] AT [facility] [address]  [address], [place] [address]     Phone:  [phone]     silver sulfADIAZINE 1 % cream         Some of these will need a paper prescription and others can be bought over the counter.  Ask your nurse if you have questions.     Bring a paper prescription for each of these medications     HYDROcodone-acetaminophen 5-325 MG per tablet               Information about OPIOIDS     PRESCRIPTION OPIOIDS: WHAT YOU NEED TO KNOW   We gave you an opioid (narcotic) pain  medicine. It is important to manage your pain, but opioids are not always the best choice. You should first try all the other options your care team gave you. Take this medicine for as short a time (and as few doses) as possible.    Some activities can increase your pain, such as bandage changes or therapy sessions. It may help to take your pain medicine 30 to 60 minutes before these activities. Reduce your stress by getting enough sleep, working on hobbies you enjoy and practicing relaxation or meditation. Talk to your care team about ways to manage your pain beyond prescription opioids.    These medicines have risks:    DO NOT drive when on new or higher doses of pain medicine. These medicines can affect your alertness and reaction times, and you could be arrested for driving under the influence (DUI). If you need to use opioids long-term, talk to your care team about driving.    DO NOT operate heavy machinery    DO NOT do any other dangerous activities while taking these medicines.    DO NOT drink any alcohol while taking these medicines.     If the opioid prescribed includes acetaminophen, DO NOT take with any other medicines that contain acetaminophen. Read all labels carefully. Look for the word  acetaminophen  or  Tylenol.  Ask your pharmacist if you have questions or are unsure.    You can get addicted to pain medicines, especially if you have a history of addiction (chemical, alcohol or substance dependence). Talk to your care team about ways to reduce this risk.    All opioids tend to cause constipation. Drink plenty of water and eat foods that have a lot of fiber, such as fruits, vegetables, prune juice, apple juice and high-fiber cereal. Take a laxative (Miralax, milk of magnesia, Colace, Senna) if you don t move your bowels at least every other day. Other side effects include upset stomach, sleepiness, dizziness, throwing up, tolerance (needing more of the medicine to have the same effect), physical  dependence and slowed breathing.    Store your pills in a secure place, locked if possible. We will not replace any lost or stolen medicine. If you don t finish your medicine, please throw away (dispose) as directed by your pharmacist. The [place] Pollution Control Agency has more information about safe disposal: https://www.pca.[facility].[place]/living-green/managing-unwanted-medications         Primary Care Provider Office Phone # Fax #    Moshe Corley [phone]       [address] [place] [address]        Equal Access to Services     [facility]: Hadii aad ku hadasho Soomaali, waaxda luqadaha, qaybta kaalmada adeegyaalycia, dale rainey . So [facility] [phone].    ATENCIÓN: Si habla español, tiene a flanagan disposición servicios gratuitos de asistencia lingüística. [facility] [phone].    We comply with applicable federal civil rights laws and [place] laws. We do not discriminate on the basis of race, color, national origin, age, disability, sex, sexual orientation, or gender identity.            Thank you!     Thank you for choosing [facility]  for your care. Our goal is always to provide you with excellent care. Hearing back from our patients is one way we can continue to improve our services. Please take a few minutes to complete the written survey that you may receive in the mail after your visit with us. Thank you!             Your Updated Medication List - Protect others around you: Learn how to safely use, store and throw away your medicines at www.disposemymeds.org.          This list is accurate as of 8/8/18  9:05 AM.  Always use your most recent med list.                   Brand Name Dispense Instructions for use Diagnosis    acetaminophen 325 MG tablet    TYLENOL    60 tablet    650 mg bid as needed for  Pain NO MORE THAN 1300 mg per day    Abdominal pain, generalized       amLODIPine 2.5 MG tablet    NORVASC    180 tablet    One tab Twice a day    Essential  hypertension with goal blood pressure less than 140/90       atorvastatin 20 MG tablet    LIPITOR    90 tablet    Take 1 tablet (20 mg) by mouth daily    Pure hypercholesterolemia       B-12 PO      Take 5,000 mcg by mouth daily        carvedilol 12.5 MG tablet    COREG    180 tablet    Take 1 tablet (12.5 mg) by mouth 2 times daily (with meals)    Chronic atrial fibrillation (H)       famotidine 20 MG tablet    PEPCID    180 tablet    Use as directed while tapering off omeprazole, up to 1 tab BID    Gastroesophageal reflux disease, esophagitis presence not specified       furosemide 40 MG tablet    LASIX    90 tablet    Take 2 tablets (80 mg) by mouth daily    Pedal edema       * HYDROcodone-acetaminophen 5-325 MG per tablet    NORCO    59 tablet    One to two as needed for pain  maximum2 tablet(s) per day    Abdominal pain, generalized       * HYDROcodone-acetaminophen 5-325 MG per tablet    NORCO    59 tablet    One to two per day no more than 2 per day/    Diverticulitis of colon       linaclotide 145 MCG capsule    LINZESS    30 capsule    Take 1 capsule (145 mcg) by mouth every morning (before breakfast)    Slow transit constipation       lisinopril 40 MG tablet    PRINIVIL/ZESTRIL    90 tablet    Take 1 tablet (40 mg) by mouth daily    Essential hypertension with goal blood pressure less than 140/90       MULTIVITAMIN ADULT PO           rivaroxaban ANTICOAGULANT 20 MG Tabs tablet    XARELTO    30 tablet    Take 1 tablet (20 mg) by mouth daily (with dinner)    Chronic atrial fibrillation (H)       silver sulfADIAZINE 1 % cream    SILVADENE    85 g    Apply topically 2 times daily    Ulcer of lower limb, left, with unspecified severity (H)       spironolactone 25 MG tablet    ALDACTONE    90 tablet    Take 1 tablet (25 mg) by mouth daily    Benign essential hypertension       * Notice:  This list has 2 medication(s) that are the same as other medications prescribed for you. Read the directions carefully, and  ask your doctor or other care provider to review them with you.

## 2018-08-08 NOTE — PATIENT INSTRUCTIONS
Ok for few vicoden   continue bowel care   wound care referral     WOUND CARE REFERRAL  Eliana asked to be scheduled only here in [place].    Spoke with Galilea Johnson with Scheduling who states at this time, [facility] is not accepting new patients as they are at max - was advised to check back in 9/2018 to see if their panel has opened    Spoke with Catherine at [facility] Scheduling who states they are accepting new patients - verified they do accept [facility] insurance as well - was advised to fax the Referral, office note and demographics to [phone] which will be reviewed by the Nursing staff who will contact patient to schedule at their  clinic if appropriate. - fax successful

## 2018-08-15 NOTE — LETTER
August 15, 2018      Eliana Bethea  [address]   [place] [address]        Dear Eliana,    Here is the information regarding your upcoming appointment    [facility]   [facility]  [address]  [place] [address]    Appointment:  Wednesday August 22, 2018  Arrival Time:  7:50 am  Provider:  Dr. aGlilea Oliver    Please bring a copy of your insurance card and photo ID    If you cannot make this appointment, please call [phone] to reschedule    Thank you,    [facility]  Referral Department  [phone]

## 2018-08-15 NOTE — PROGRESS NOTES
Eliana complains of Pain is migrating into whole foot, limping. The wound has not gotten larger. The skin around it is very sensitive - it has been going on for a year - the skin is pink and sensitive to touch. Drainage has been going on for months - stinking - ongoing. States no new symptoms. Vague pain in left calf and left ankle - wound is on the inside of the left ankle. Denies fevers. Denies any new symptoms.    Patient's appointment with [facility] is on 8/22/18 - this is the soonest available appointment for any Wound Clinic in the [facility] to continue current treatment and be seen 8/22/18? Follow up in clinic sooner?    Please advise     (I did page Dr. Corley at 11:02 am to discuss as well)

## 2018-08-15 NOTE — PROGRESS NOTES
Spoke with Dr. Corley - Froylan had not received a call from [facility] to schedule his Wound Care Clinic    I called and spoke with Catherine from [facility] - she apologized as she was advised the Nursing Staff did not reach out to Froylan to schedule. I did ask why no one reached out to Froylan or myself regarding this - was advised it was a miss. The soonest they would be able to get Eliana in at this time would be 9/10/2018 - advised I would find a different clinic who could get the patient in for evaluation and management sooner.     Called and spoke with Eliana - he is willing to go to [place] if needed.    Called [facility] and was able to get Eliana an appointment    [facility]   [address]  [place] [address]    Appointment:  Wednesday August 22, 2018  Arrival Time:  7:50 am  Provider:  Dr. Galilea Oliver    Please bring a copy of your insurance card and photo ID    August 15, 2018 at 10:55 am faxed referral, demographics, office note and medication list to [phone]    Spoke with Eliana - he is aware of the appointment details - mailing a letter as well - advised will send message to Dr. Corley to update him on status as well

## 2018-09-12 PROBLEM — I87.2 VENOUS (PERIPHERAL) INSUFFICIENCY: Status: ACTIVE | Noted: 2018-01-01

## 2018-09-12 NOTE — MR AVS SNAPSHOT
"              After Visit Summary   9/12/2018    Eliana Bethea    MRN: [id number]           Patient Information     Date Of Birth          1940        Visit Information        Provider Department      9/12/2018 11:00 AM Moshe Corley MD [facility]        Today's Diagnoses     Gastroesophageal reflux disease without esophagitis    -  1    Abdominal pain, generalized          Care Instructions    Follow-up with vascular   follow-up with GI   gave more pain mediciaotns and reflux medication          Follow-ups after your visit        Your next 10 appointments already scheduled     Sep 12, 2018 11:00 AM CDT   Return Visit with Moshe Corley MD   [facility] ([facility])    [address] [place] [address]   [phone]              Who to contact     Please call your clinic at [phone] to:    Ask questions about your health    Make or cancel appointments    Discuss your medicines    Learn about your test results    Speak to your doctor            Additional Information About Your Visit        Care EveryWhere ID     This is your Care EveryWhere ID. This could be used by other organizations to access your [facility] medical records  [id number]        Your Vitals Were     Pulse Temperature Respirations Height Pulse Oximetry BMI (Body Mass Index)    60 97.9  F (36.6  C) (Oral) 20 6' 3\" (190.5 cm) 100% 29.6 kg/m2       Blood Pressure from Last 3 Encounters:   09/12/18 108/67   08/08/18 134/63   07/10/18 113/64    Weight from Last 3 Encounters:   09/12/18 236 lb 12.8 oz (107.4 kg)   08/08/18 244 lb (110.7 kg)   07/10/18 250 lb (113.4 kg)              Today, you had the following     No orders found for display         Today's Medication Changes          These changes are accurate as of 9/12/18 10:49 AM.  If you have any questions, ask your nurse or doctor.               Start taking these medicines.        Dose/Directions    ranitidine 300 MG tablet   Commonly known as:  ZANTAC   Used for:  " Gastroesophageal reflux disease without esophagitis   Started by:  Moshe Corley MD        Dose:  300 mg   Take 1 tablet (300 mg) by mouth At Bedtime   Quantity:  30 tablet   Refills:  1         These medicines have changed or have updated prescriptions.        Dose/Directions    carvedilol 12.5 MG tablet   Commonly known as:  COREG   This may have changed:    - how much to take  - when to take this   Used for:  Chronic atrial fibrillation (H)        Dose:  12.5 mg   Take 1 tablet (12.5 mg) by mouth 2 times daily (with meals)   Quantity:  180 tablet   Refills:  11            Where to get your medicines      These medications were sent to [id number] - [place], [place] - [address] [place] [address] AT [facility] [address]  [address], [place] [address]     Phone:  [phone]     ranitidine 300 MG tablet         Some of these will need a paper prescription and others can be bought over the counter.  Ask your nurse if you have questions.     Bring a paper prescription for each of these medications     HYDROcodone-acetaminophen 5-325 MG per tablet               Information about OPIOIDS     PRESCRIPTION OPIOIDS: WHAT YOU NEED TO KNOW   We gave you an opioid (narcotic) pain medicine. It is important to manage your pain, but opioids are not always the best choice. You should first try all the other options your care team gave you. Take this medicine for as short a time (and as few doses) as possible.    Some activities can increase your pain, such as bandage changes or therapy sessions. It may help to take your pain medicine 30 to 60 minutes before these activities. Reduce your stress by getting enough sleep, working on hobbies you enjoy and practicing relaxation or meditation. Talk to your care team about ways to manage your pain beyond prescription opioids.    These medicines have risks:    DO NOT drive when on new or higher doses of pain medicine. These medicines can affect your alertness and reaction  times, and you could be arrested for driving under the influence (DUI). If you need to use opioids long-term, talk to your care team about driving.    DO NOT operate heavy machinery    DO NOT do any other dangerous activities while taking these medicines.    DO NOT drink any alcohol while taking these medicines.     If the opioid prescribed includes acetaminophen, DO NOT take with any other medicines that contain acetaminophen. Read all labels carefully. Look for the word  acetaminophen  or  Tylenol.  Ask your pharmacist if you have questions or are unsure.    You can get addicted to pain medicines, especially if you have a history of addiction (chemical, alcohol or substance dependence). Talk to your care team about ways to reduce this risk.    All opioids tend to cause constipation. Drink plenty of water and eat foods that have a lot of fiber, such as fruits, vegetables, prune juice, apple juice and high-fiber cereal. Take a laxative (Miralax, milk of magnesia, Colace, Senna) if you don t move your bowels at least every other day. Other side effects include upset stomach, sleepiness, dizziness, throwing up, tolerance (needing more of the medicine to have the same effect), physical dependence and slowed breathing.    Store your pills in a secure place, locked if possible. We will not replace any lost or stolen medicine. If you don t finish your medicine, please throw away (dispose) as directed by your pharmacist. The [place] Pollution Control Agency has more information about safe disposal: https://www.pca.Pending sale to [facility].[place]/living-green/managing-unwanted-medications         Primary Care Provider Office Phone # Fax #    Moshe Corley [phone]       [address] [place] [address]        Equal Access to Services     [facility] : Hadjuan c Mcallister, wadottie cruz, qaybdale hernandez. [facility] [phone].    ATENCIÓN: Elkin amezcua,  tiene a flanagan disposición servicios gratuitos de asistencia lingüística. Riri murillo [phone].    We comply with applicable federal civil rights laws and [place] laws. We do not discriminate on the basis of race, color, national origin, age, disability, sex, sexual orientation, or gender identity.            Thank you!     Thank you for choosing [facility]  for your care. Our goal is always to provide you with excellent care. Hearing back from our patients is one way we can continue to improve our services. Please take a few minutes to complete the written survey that you may receive in the mail after your visit with us. Thank you!             Your Updated Medication List - Protect others around you: Learn how to safely use, store and throw away your medicines at www.disposemymeds.org.          This list is accurate as of 9/12/18 10:49 AM.  Always use your most recent med list.                   Brand Name Dispense Instructions for use Diagnosis    acetaminophen 325 MG tablet    TYLENOL    60 tablet    650 mg bid as needed for  Pain NO MORE THAN 1300 mg per day    Abdominal pain, generalized       amLODIPine 2.5 MG tablet    NORVASC    180 tablet    One tab Twice a day    Essential hypertension with goal blood pressure less than 140/90       atorvastatin 20 MG tablet    LIPITOR    90 tablet    Take 1 tablet (20 mg) by mouth daily    Pure hypercholesterolemia       B-12 PO      Take 5,000 mcg by mouth daily        carvedilol 12.5 MG tablet    COREG    180 tablet    Take 1 tablet (12.5 mg) by mouth 2 times daily (with meals)    Chronic atrial fibrillation (H)       famotidine 20 MG tablet    PEPCID    180 tablet    Use as directed while tapering off omeprazole, up to 1 tab BID    Gastroesophageal reflux disease, esophagitis presence not specified       furosemide 40 MG tablet    LASIX    90 tablet    Take 2 tablets (80 mg) by mouth daily    Pedal edema       * HYDROcodone-acetaminophen 5-325 MG per tablet     NORCO    59 tablet    One to two per day no more than 2 per day/    Diverticulitis of colon       * HYDROcodone-acetaminophen 5-325 MG per tablet    NORCO    59 tablet    One to two as needed for pain  maximum2 tablet(s) per day    Abdominal pain, generalized       linaclotide 145 MCG capsule    LINZESS    30 capsule    Take 1 capsule (145 mcg) by mouth every morning (before breakfast)    Slow transit constipation       lisinopril 40 MG tablet    PRINIVIL/ZESTRIL    90 tablet    Take 1 tablet (40 mg) by mouth daily    Essential hypertension with goal blood pressure less than 140/90       MULTIVITAMIN ADULT PO      Take 1 tablet by mouth daily        ranitidine 300 MG tablet    ZANTAC    30 tablet    Take 1 tablet (300 mg) by mouth At Bedtime    Gastroesophageal reflux disease without esophagitis       rivaroxaban ANTICOAGULANT 20 MG Tabs tablet    XARELTO    30 tablet    Take 1 tablet (20 mg) by mouth daily (with dinner)    Chronic atrial fibrillation (H)       silver sulfADIAZINE 1 % cream    SILVADENE    85 g    Apply topically 2 times daily    Ulcer of lower limb, left, with unspecified severity (H)       spironolactone 25 MG tablet    ALDACTONE    90 tablet    Take 1 tablet (25 mg) by mouth daily    Benign essential hypertension       * Notice:  This list has 2 medication(s) that are the same as other medications prescribed for you. Read the directions carefully, and ask your doctor or other care provider to review them with you.

## 2018-09-12 NOTE — NURSING NOTE
Chief Complaint   Patient presents with     RECHECK     Blood Pressure Check and Med refills/ Having Sking ulcer in his Left ankle         Terry Monroe, CMA

## 2018-09-12 NOTE — PROGRESS NOTES
"S: Eliana Bethea is a 78 year old male who returns for follow up of  Refill pain medications, requests H2 blockers for reflux, has had ulcer care/ off antibiotics, continue would care, has had more Lasix for  leg edema  support stocking an  Leg raps, feel that  his legs are better  Patients states that main concern today is follow-up leg ulcers    PMHX/PSHX/MEDS/ALLERGIES/SHX/FHX reviewed and updated in Epic.      ROS:  General: No fevers, chills  Head: No headache  Ears: No acute change in hearing.    CV: No chest pain or palpitations.  Resp: No shortness of breath.  No cough. No hemoptysis.  GI: No nausea, vomiting, constipation, diarrhea  : No urinary pains    O: /67  Pulse 60  Temp 97.9  F (36.6  C) (Oral)  Resp 20  Ht 6' 3\" (190.5 cm)  Wt 236 lb 12.8 oz (107.4 kg)  SpO2 100%  BMI 29.6 kg/m2   Gen:  Well nourished and in NAD    CV:  IRRR  - no murmurs, rubs, or gallups,   Pulm:  CTAB, no wheezes/rales/rhonchi, good air entry   ABD: soft, nontender, no masses, no rebound, BS intact throughout  Extrem: no cyanosis, or clubbing, edema bilateral 1 plus/beter than before  Psych: Euthymic      (K21.9) Gastroesophageal reflux disease without esophagitis  (primary encounter diagnosis)  Comment:  Mild   Plan: ranitidine (ZANTAC) 300 MG tablet       (R10.84) Abdominal pain, generalized   Diverticulitis    Plan: HYDROcodone-acetaminophen (NORCO) 5-325 MG        3  Leg leg ulcer:  venus US : deep vein incompetency but normal arterial competency  S/p debridement/needs compression   Double layer tubigrip and comprilan    RTC in 4weeks, for follow up o regular care or sooner if develops new or worsening symptoms.    Moshe Corley      "

## 2018-10-03 DIAGNOSIS — K21.9 GASTROESOPHAGEAL REFLUX DISEASE WITHOUT ESOPHAGITIS: ICD-10-CM

## 2018-12-12 ENCOUNTER — RECORDS - HEALTHEAST (OUTPATIENT)
Dept: ADMINISTRATIVE | Facility: OTHER | Age: 78
End: 2018-12-12

## 2021-05-25 ENCOUNTER — RECORDS - HEALTHEAST (OUTPATIENT)
Dept: ADMINISTRATIVE | Facility: CLINIC | Age: 81
End: 2021-05-25

## 2021-05-26 ENCOUNTER — RECORDS - HEALTHEAST (OUTPATIENT)
Dept: ADMINISTRATIVE | Facility: CLINIC | Age: 81
End: 2021-05-26

## 2021-05-28 ENCOUNTER — RECORDS - HEALTHEAST (OUTPATIENT)
Dept: ADMINISTRATIVE | Facility: CLINIC | Age: 81
End: 2021-05-28

## 2021-05-29 ENCOUNTER — RECORDS - HEALTHEAST (OUTPATIENT)
Dept: ADMINISTRATIVE | Facility: CLINIC | Age: 81
End: 2021-05-29

## 2021-05-31 ENCOUNTER — RECORDS - HEALTHEAST (OUTPATIENT)
Dept: ADMINISTRATIVE | Facility: CLINIC | Age: 81
End: 2021-05-31

## 2021-05-31 VITALS — BODY MASS INDEX: 31.58 KG/M2 | HEIGHT: 75 IN | WEIGHT: 254 LBS

## 2021-05-31 VITALS — HEIGHT: 75 IN | BODY MASS INDEX: 31.71 KG/M2 | WEIGHT: 255 LBS

## 2021-05-31 VITALS — BODY MASS INDEX: 31.13 KG/M2 | WEIGHT: 250.4 LBS | HEIGHT: 75 IN

## 2021-05-31 VITALS — BODY MASS INDEX: 32.2 KG/M2 | WEIGHT: 259 LBS | HEIGHT: 75 IN

## 2021-05-31 VITALS — BODY MASS INDEX: 32.56 KG/M2 | WEIGHT: 261.9 LBS | HEIGHT: 75 IN

## 2021-05-31 VITALS — BODY MASS INDEX: 32.04 KG/M2 | HEIGHT: 75 IN | WEIGHT: 257.7 LBS

## 2021-05-31 VITALS — WEIGHT: 256 LBS | BODY MASS INDEX: 32 KG/M2

## 2021-06-01 ENCOUNTER — RECORDS - HEALTHEAST (OUTPATIENT)
Dept: ADMINISTRATIVE | Facility: CLINIC | Age: 81
End: 2021-06-01

## 2021-06-01 VITALS — HEIGHT: 75 IN | WEIGHT: 244 LBS | BODY MASS INDEX: 30.34 KG/M2

## 2021-06-01 VITALS — WEIGHT: 256 LBS | BODY MASS INDEX: 31.83 KG/M2 | HEIGHT: 75 IN

## 2021-06-01 VITALS — HEIGHT: 75 IN | BODY MASS INDEX: 33.2 KG/M2 | WEIGHT: 267 LBS

## 2021-06-01 VITALS — HEIGHT: 75 IN | WEIGHT: 253 LBS | BODY MASS INDEX: 31.46 KG/M2

## 2021-06-01 VITALS — BODY MASS INDEX: 31.4 KG/M2 | HEIGHT: 75 IN | WEIGHT: 252.56 LBS

## 2021-06-01 VITALS — BODY MASS INDEX: 33.94 KG/M2 | HEIGHT: 75 IN | WEIGHT: 273 LBS

## 2021-06-01 VITALS — BODY MASS INDEX: 30.34 KG/M2 | HEIGHT: 75 IN | WEIGHT: 244 LBS

## 2021-06-01 VITALS — WEIGHT: 271 LBS | BODY MASS INDEX: 33.87 KG/M2

## 2021-06-01 VITALS — WEIGHT: 264 LBS | HEIGHT: 75 IN | BODY MASS INDEX: 32.83 KG/M2

## 2021-06-01 VITALS — HEIGHT: 75 IN | BODY MASS INDEX: 31.83 KG/M2 | WEIGHT: 256 LBS

## 2021-06-01 VITALS — WEIGHT: 262 LBS | HEIGHT: 75 IN | BODY MASS INDEX: 32.58 KG/M2

## 2021-06-02 ENCOUNTER — RECORDS - HEALTHEAST (OUTPATIENT)
Dept: ADMINISTRATIVE | Facility: CLINIC | Age: 81
End: 2021-06-02

## 2021-06-02 VITALS — HEIGHT: 75 IN | BODY MASS INDEX: 29.72 KG/M2 | WEIGHT: 239 LBS

## 2021-06-02 VITALS — BODY MASS INDEX: 30.09 KG/M2 | HEIGHT: 75 IN | WEIGHT: 242 LBS

## 2021-06-11 NOTE — PROGRESS NOTES
In clinic device check with Device RN and follow-up with Dr. Estrada..  Please see link for full device report.  Patient was informed of results and next follow up during today's visit. Device follow-up increased to monthly battery checks as device estimates 1 year remaining battery longevity and the magnet rate is 90 ppm.

## 2021-06-13 NOTE — PROGRESS NOTES
Type: Monthly in-clinic IPG check for battery status.  Presenting Rhythm: Ventricular pacing at 60 bpm.  Lead/Battery status: Stable lead and battery measurements. Magnet Rate at 90 bpm. Battery longevity estimates <6 months.   Arrhythmias: 1 ventricular high rate detected, EGM characteristics suggest NSVT of 10 beats ~200 bpm on 10/04/17, 1232 hours. Patient recalls no correlating symptoms.  Comments: Normal device function; no programming changes. Most recent echocardiagram was 03/21/16, EF 50-55%. RTC in one month. Routed to Dr. Estrada for review.    Dr. Natalie Early I am sending this to you as Mr. Bethea experienced this NSVT and his last echo is >1 year ago.  Routed per Device Clinic protocol. Thank you, Leonor

## 2021-06-13 NOTE — CONSULTS
Consults   [facility] Hematology and Oncology Consult Note    Patient: Eliana Bethea  MRN: [id number]  Date of Service: 09/21/2017        Reason for Visit     1. MGUS (monoclonal gammopathy of unknown significance)  Comprehensive Metabolic Panel    HM1(CBC and Differential)    Immunoglobulin Free Light Chains, Serum    Immunofixation Electrophoresis, Serum    Immunoglobulins IgG, IgA, IgM    Lactate Dehydrogenase (LDH)    Immunoglobulin Total Light Chains, Urine    Immunofixation Electrophoresis, Urine    HM1(CBC and Differential)    Comprehensive Metabolic Panel    Immunoglobulin Free Light Chains, Serum    Immunoglobulins IgG, IgA, IgM    Lactate Dehydrogenase (LDH)         Assessment     1.  IgA kappa monoclonal myopathy.  2.  Slightly decreased IgM levels.  3.  No significant hypercalcemia or renal dysfunction.  4.  Mild anemia which is chronic for him.  In fact it is slightly better than his last hemoglobin that I have known.  5.  Slight hepatic fibrosis etc.  This is associated with slightly increased bilirubin.    Plan     1.  At this time I am going to quantitate his immunoglobulin levels as well as to quantitate his monoclonal myopathy.  2.  Depending on the lab results he will have to come back in 6 months for repeat blood test.  I advised him to have blood test a week before he comes and sees me.  3.  Continue with good diet and exercise.  4.  Discussed with him that certain patients need to be followed very closely because some can develop into multiple myeloma.  At this time his labs do not indicate that he has multiple myeloma.    Staging History     No matching staging information was found for the patient.    History     Mr. Btehea is a very pleasant 77-year-old man who has been referred to me by [facility] for evaluation of newly diagnosed IgA kappa monoclonal gammopathy.  This was diagnosed incidentally while he was undergoing evaluation by the [facility] for some abdominal discomfort type of  symptoms.  As a part of that workup the did some labs which indicated that he had elevated levels of IgA immunoglobulins.  Further workup in the form of serum protein electrophoresis suggested that he had a monoclonal component rather than just a simple elevated IgA which can sometimes be seen in some type of celiac disease or other inflammatory GI disorders.  Due to that reason he was asked to come and see me.    The patient has never had any other monoclonal gammopathy diagnosed before.  No symptoms suggestive of multiple myeloma including back pain and anemia etc.  He is otherwise a healthy person.  He did have evaluation done a few months ago for hepatic steatosis etc.  There was concerned that he may have had some liver fibrosis from excessive drinking in the past.    Past Medical History     Past Medical History:   Diagnosis Date     AAA (abdominal aortic aneurysm)      Alcohol dependence      Anemia      Aphakia      Arrhythmia      Arthritis      Atrial fibrillation, chronic      Coronary artery disease      Depression      Dermatitis      Gout      Hyperlipidemia      Hypertension      Liver cell damage      Pacemaker        Past Social History     Social History     Social History     Marital status:      Spouse name: N/A     Number of children: 0     Years of education: N/A     Occupational History     Artist      Social History Main Topics     Smoking status: Never Smoker     Smokeless tobacco: Never Used     Alcohol use 0.0 oz/week     3 - 6 Cans of beer per week     Drug use: No     Sexual activity: No     Other Topics Concern     Not on file     Social History Narrative       Family History     Family History   Problem Relation Age of Onset     Heart disease Father      No Medical Problems Sister      No Medical Problems Brother      No Medical Problems Sister      No Medical Problems Sister      No Medical Problems Sister      Clotting disorder Neg Hx      Anesthesia problems Neg Hx         Medication List     Prior to Admission medications    Medication Sig Start Date End Date Taking? Authorizing Provider   acetaminophen (TYLENOL) 500 MG tablet as needed for pain.   Yes PROVIDER, HISTORICAL   amLODIPine (NORVASC) 5 MG tablet Take 1 tablet (5 mg total) by mouth daily.  Patient taking differently: Take 2.5 mg by mouth 2 (two) times a day.  5/12/16  Yes Bipin Foster MD   ascorbic acid, vitamin C, (VITAMIN C) 1000 MG tablet Take 1,000 mg by mouth daily.   Yes PROVIDER, HISTORICAL   atorvastatin (LIPITOR) 20 MG tablet Take 20 mg by mouth every morning.   Yes PROVIDER, HISTORICAL   carvedilol (COREG) 6.25 MG tablet Take 2 tablets (12.5 mg total) by mouth 2 (two) times a day with meals.  Patient taking differently: Take 6.25 mg by mouth daily.  5/18/16  Yes Carmen Estrada MD   cyanocobalamin 1000 MCG tablet Take 5,000 mcg by mouth.   Yes PROVIDER, HISTORICAL   furosemide (LASIX) 40 MG tablet Take 1 tablet (40 mg total) by mouth daily. 5/18/16  Yes Carmen Estrada MD   GAS RELIEF 125 mg chewable tablet TK 1 TO 3 TS PO QD FOR GAS OR PAIN 6/28/17  Yes PROVIDER, HISTORICAL   HYDROcodone-acetaminophen 5-325 mg per tablet One to two per day as needed for abdominal pain 9/14/17  Yes PROVIDER, HISTORICAL   LINZESS 145 mcg cap capsule TK 1 C PO QAM BEFORE BREAKFAST 6/30/17  Yes PROVIDER, HISTORICAL   lisinopril (PRINIVIL,ZESTRIL) 40 MG tablet Take 40 mg by mouth every morning.    Yes PROVIDER, HISTORICAL   multivitamin therapeutic (THERAGRAN) tablet Take 1 tablet by mouth daily.   Yes PROVIDER, HISTORICAL   omeprazole (PRILOSEC) 20 MG capsule Take 20 mg by mouth every morning.    Yes PROVIDER, HISTORICAL   rivaroxaban (XARELTO) 20 mg Tab Take 20 mg by mouth every morning. Last dose sat 10/31   Yes PROVIDER, HISTORICAL       Allergies     No Known Allergies    Review of Systems   A comprehensive review of 14 systems was done. Pertinent findings noted here and in history of present illness. All the rest  negative.         Pain  Currently in Pain: Yes  Pain Score (Initial OR Reassessment): 2 (up to a 4)  Pain Frequency: Constant/continuous  Location: abd  Pain Characteristics : Throbbing;Burning  Pain Intervention(s): Home medication  Response to Interventions: helpful    Physical Exam     Recent Vitals 9/21/2017   Height -   Weight 256 lbs   BSA (m2) -   /60   Pulse 63   Temp 98.4   Temp src 1   SpO2 99   Some recent data might be hidden       Constitutional: Oriented to person, place, and time and appears well-developed.   HEENT:  Normocephalic and atraumatic.  Eyes: Conjunctivae and EOM are normal. Pupils are equal, round, and reactive to light. No discharge.  No scleral icterus. Nose normal. Mouth/Throat: Oropharynx is clear and moist. No oropharyngeal exudate.    NECK: Normal range of motion. Neck supple. No JVD present. No tracheal deviation present. No thyromegaly present.   CARDIOVASCULAR: Normal rate, regular rhythm and intact distal pulses.  Exam reveals no gallop and no friction rub.  Systolic murmur present.  PULMONARY: Effort normal and breath sounds normal. No respiratory distress.No Wheezing or rales.  ABDOMEN: Soft. Bowel sounds are normal. No distension and no mass.  There is no tenderness. There is no rebound and no guarding. No HSM.  MUSCULOSKELETAL: Normal range of motion. No edema and no tenderness. Mild kyphosis, no tenderness.  LYMPH NODES: Has no cervical, supraclavicular, axillary and groin adenopathy.   NEUROLOGICAL: Alert and oriented to person, place, and time. No cranial nerve deficit.  Normal muscle tone. Coordination normal.   GENITOURINARY: Deferred exam.  SKIN: Skin is warm and dry. No rash noted. No erythema. No pallor.   EXTREMITIES: No cyanosis, no clubbing, 1+ edema. No Deformity.  PSYCHIATRIC: Normal mood, affect and behavior.      Lab Results     Recent Results (from the past 48 hour(s))   Comprehensive Metabolic Panel   Result Value Ref Range    Sodium 138 136 - 145  mmol/L    Potassium 4.7 3.5 - 5.0 mmol/L    Chloride 100 98 - 107 mmol/L    CO2 30 22 - 31 mmol/L    Anion Gap, Calculation 8 5 - 18 mmol/L    Glucose 110 70 - 125 mg/dL    BUN 15 8 - 28 mg/dL    Creatinine 0.87 0.70 - 1.30 mg/dL    GFR MDRD Af Amer >60 >60 mL/min/1.73m2    GFR MDRD Non Af Amer >60 >60 mL/min/1.73m2    Bilirubin, Total 1.4 (H) 0.0 - 1.0 mg/dL    Calcium 9.3 8.5 - 10.5 mg/dL    Protein, Total 8.0 6.0 - 8.0 g/dL    Albumin 3.9 3.5 - 5.0 g/dL    Alkaline Phosphatase 100 45 - 120 U/L    AST 17 0 - 40 U/L    ALT 15 0 - 45 U/L   HM1 (CBC with Diff)   Result Value Ref Range    WBC 4.7 4.0 - 11.0 thou/uL    RBC 3.53 (L) 4.40 - 6.20 mill/uL    Hemoglobin 12.0 (L) 14.0 - 18.0 g/dL    Hematocrit 35.0 (L) 40.0 - 54.0 %    MCV 99 80 - 100 fL    MCH 34.0 27.0 - 34.0 pg    MCHC 34.3 32.0 - 36.0 g/dL    RDW 13.0 11.0 - 14.5 %    Platelets 184 140 - 440 thou/uL    MPV 10.0 8.5 - 12.5 fL    Neutrophils % 60 50 - 70 %    Lymphocytes % 26 20 - 40 %    Monocytes % 12 (H) 2 - 10 %    Eosinophils % 1 0 - 6 %    Basophils % 1 0 - 2 %    Neutrophils Absolute 2.9 2.0 - 7.7 thou/uL    Lymphocytes Absolute 1.2 0.8 - 4.4 thou/uL    Monocytes Absolute 0.6 0.0 - 0.9 thou/uL    Eosinophils Absolute 0.1 0.0 - 0.4 thou/uL    Basophils Absolute 0.0 0.0 - 0.2 thou/uL           Imaging Results     Us Abdomen Limited    Result Date: 8/29/2017  US ABDOMEN LIMITED 8/29/2017 1:03 PM INDICATION: Epigastric pain COMPARISON: CT of the abdomen and pelvis on 11/20/2012 FINDINGS: GALLBLADDER: Normal, without cholelithiasis. BILE DUCTS: No bile duct dilation. The common bile duct measures 2 mm. LIVER: Normal where seen. RIGHT KIDNEY: Measures 14.8 cm in length. No hydronephrosis. Small 1.1 cm cyst noted along the lateral aspect. PANCREAS: Not imaged in detail on this limited study. No incidental abnormalities. No ascites in the right upper quadrant.     CONCLUSION: 1.  No findings identified to account for epigastric pain. No cholelithiasis. 2.   Small renal cyst.    Us Mesentaric Arteries Complete    Result Date: 8/29/2017  ABDOMINAL ORGAN DUPLEX VASCULAR ULTRASOUND 8/29/2017 1:04 PM INDICATIONS: Epigastric pain. Possible mesenteric insufficiency. TECHNIQUE: Duplex ultrasound with two-dimensional grayscale imaging, and color-flow, Doppler, and spectral analysis. COMPARISONS: None. FINDINGS: There is mild aneurysmal dilatation of the infrarenal abdominal aorta, measuring 3.1 cm in greatest dimension. The superior mesenteric artery is patent. There is modest elevation of peak systolic flow velocities (239 cm/s), suggesting mild to moderate narrowing. The celiac trunk and inferior mesenteric artery are not visualized. CONCLUSIONS: 1. This is a nondiagnostic study, with nonvisualization of the celiac trunk and inferior mesenteric artery. The superior mesenteric artery is patent. If there is ongoing clinical concern for mesenteric arterial insufficiency, consider further evaluation with CT angiography.          Total time spent was 60 minutes, more than half of it was in face-to-face counseling regarding disease state, review of available images, laboratory values, pathological reports, treatment, options, their side effects and management.    Denys Anthony MD

## 2021-06-13 NOTE — PROGRESS NOTES
In clinic monthly device check.  Please see link for full device report.  Patient was informed of results and next follow up during today's visit.

## 2021-06-14 NOTE — PROGRESS NOTES
Type: In office monthly pacemaker check, nearing DEVAN  Presenting Rhythm: Ventricular paced 60bpm.  Lead/Battery status: Leads not tested (last done 10/31/17. <0.5 year remaining.   Arrhythmias: Presumed permanent AF. 1 high ventricular arrhythmia, appears to be NSVT, 11-bts, 11/16/17 16:16, asymptomatic. Last ECHO 3/21/16 EF 50-55%.  Anticoagulant: Xarelto  Comments: Normal device function and no changes. Continue monthly office checks. Routing NSVT to Dr. Estrada. MEAGAN

## 2021-06-16 NOTE — PROGRESS NOTES
Lab orders for March 2018 have been faxed to Dr Moshe Corley's office.  Patient wants to have drawn at that lab .  I requested that they fax us the results in time for his 3/19/18 appt with Dr Anthony.    Jenniffer Carl, RN 3/5/18 0898

## 2021-06-16 NOTE — PROGRESS NOTES
[facility] Cancer Care Progress Note    Patient: Eliana Bethea  MRN: [id number]  Date of Service: 3/14/2018        Reason for visit      1. MGUS (monoclonal gammopathy of unknown significance)        Assessment     1.  IgA kappa monoclonal gammopathy.  This appears stable.  The Keppra levels have actually come down a little bit.  Kappa lambda ratio is improved.  2.  Slightly decreased IgM levels.  3.  No significant hypercalcemia or renal dysfunction.  4.  Mild anemia which is chronic for him.  In fact it is slightly better than his last hemoglobin that I have known.  5.  Slight hepatic fibrosis etc.  This is associated with slightly increased bilirubin.    Plan     1.  I think at this point continue yearly follow-up.  He will have labs drawn a week before he sees me.  2.  Follow-up with his regular physician for his other medical issues.  3.  Advised to call if he notices any bone pain or if his primary care provider notices any hypercalcemia etc.    Clinical stage      Stage 0    History     Eliana Bethea is a very pleasant 78 y.o. old male with a history of  IgA kappa monoclonal gammopathy.  This was diagnosed incidentally in August 2017 while he was undergoing evaluation by the [facility] for some abdominal discomfort type of symptoms.  As a part of that workup the did some labs which indicated that he had elevated levels of IgA immunoglobulins.  Further workup in the form of serum protein electrophoresis suggested that he had a monoclonal component rather than just a simple elevated IgA which can sometimes be seen in some type of celiac disease or other inflammatory GI disorders.  Due to that reason he was asked to come and see me.     The patient has never had any other monoclonal gammopathy diagnosed before.  No symptoms suggestive of multiple myeloma including back pain and anemia etc.  He is otherwise a healthy person.  He did have evaluation done a few months ago for hepatic steatosis etc.  There was  concerned that he may have had some liver fibrosis from excessive drinking in the past.    Comes in today for scheduled f/u. He had labs last week.    Past Medical History     Past Medical History:   Diagnosis Date     AAA (abdominal aortic aneurysm)      Alcohol dependence      Anemia      Aphakia      Arrhythmia      Arthritis      Atrial fibrillation, chronic      Coronary artery disease      Depression      Dermatitis      Gout      Hyperlipidemia      Hypertension      Liver cell damage      Pacemaker            Review of Systems   Constitutional  Constitutional (WDL): Exceptions to WDL  Weight Gain: 5 - <10% from baseline (up 7 lbs)  Neurosensory  Neurosensory (WDL): All neurosensory elements are within defined limits  Cardiovascular  Cardiovascular (WDL): Exceptions to WDL  Edema: Yes  Edema Limbs: 5 - 10% inter-limb discrepancy in volume or circumference at point of greatest visible difference, swelling or obscuration of anatomic architecture on close inspection (on lasix/ ankles/ wears compression stockings)  Pulmonary  Respiratory (WDL): Within Defined Limits  Gastrointestinal  Gastrointestinal (WDL): Exceptions to WDL  Constipation: Occasional or intermittent symptoms, occasional use of stool softeners, laxatives, dietary modification, or enema  Genitourinary  Genitourinary (WDL): All genitourinary elements are within defined limits  Integumentary  Integumentary (WDL): All integumentary elements are within defined limits  Patient Coping  Patient Coping: Accepting  Accompanied by  Accompanied by: Alone    ECOG performance status and Distress Assessment      ECOG Performance:    ECOG Performance Status: 1    Distress Assessment  Distress Assessment Score: 3:     Pain Status  Currently in Pain: Yes        Vital Signs     Vitals:    03/14/18 1101   BP: 155/68   Pulse: 60   Temp: 98  F (36.7  C)   SpO2: 97%       Physical Exam     GENERAL: No acute distress. Cooperative in conversation.   HEENT: Pupils are  equal, round and reactive. Oral mucosa is clean and intact. No ulcerations or mucositis noted. No bleeding noted.  RESP:Chest symmetric lungs are clear bilaterally per auscultation. Regular respiratory rate. No wheezes or rhonchi.  CV: Normal S1 S2 Regular, rate and rhythm. Very soft systolic murmurs.  ABD: Nondistended, soft, nontender. Positive bowel sounds. No organomegaly.   EXTREMITIES: 2+ lower extremity edema. Right more than left. Some skin discoloration.  NEURO: Non- focal. Alert and oriented x3.  Cranial nerves appear intact.  PSYCH: Within normal limits. No depression or anxiety.  SKIN: Warm dry intact.  Some skin pigmentations of the legs.      Lab Results     Results for orders placed or performed in visit on 03/09/18   Comprehensive Metabolic Panel   Result Value Ref Range    Sodium 141 136 - 145 mmol/L    Potassium 4.7 3.5 - 5.0 mmol/L    Chloride 104 98 - 107 mmol/L    CO2 26 22 - 31 mmol/L    Anion Gap, Calculation 11 5 - 18 mmol/L    Glucose 92 70 - 125 mg/dL    BUN 17 8 - 28 mg/dL    Creatinine 0.89 0.70 - 1.30 mg/dL    GFR MDRD Af Amer >60 >60 mL/min/1.73m2    GFR MDRD Non Af Amer >60 >60 mL/min/1.73m2    Bilirubin, Total 0.9 0.0 - 1.0 mg/dL    Calcium 9.0 8.5 - 10.5 mg/dL    Protein, Total 7.6 6.0 - 8.0 g/dL    Albumin 3.6 3.5 - 5.0 g/dL    Alkaline Phosphatase 114 45 - 120 U/L    AST 21 0 - 40 U/L    ALT 20 0 - 45 U/L   Immunoglobulins IgG, IgA, IgM   Result Value Ref Range    Immunoglobulin G 981 700 - 1700 mg/dL    Immunoglobulin M 47 (L) 60 - 280 mg/dL    Immunoglobulin A 1416 (H) 65 - 400 mg/dL   Lactate Dehydrogenase (LDH)   Result Value Ref Range    LD (LDH) 165 125 - 220 U/L   HM1 (CBC with Diff)   Result Value Ref Range    WBC 4.6 4.0 - 11.0 thou/uL    RBC 3.66 (L) 4.40 - 6.20 mill/uL    Hemoglobin 12.1 (L) 14.0 - 18.0 g/dL    Hematocrit 37.6 (L) 40.0 - 54.0 %     (H) 80 - 100 fL    MCH 33.1 27.0 - 34.0 pg    MCHC 32.2 32.0 - 36.0 g/dL    RDW 13.1 11.0 - 14.5 %    Platelets 216  140 - 440 thou/uL    MPV 11.1 8.5 - 12.5 fL    Neutrophils % 52 50 - 70 %    Lymphocytes % 33 20 - 40 %    Monocytes % 13 (H) 2 - 10 %    Eosinophils % 2 0 - 6 %    Basophils % 1 0 - 2 %    Neutrophils Absolute 2.4 2.0 - 7.7 thou/uL    Lymphocytes Absolute 1.5 0.8 - 4.4 thou/uL    Monocytes Absolute 0.6 0.0 - 0.9 thou/uL    Eosinophils Absolute 0.1 0.0 - 0.4 thou/uL    Basophils Absolute 0.0 0.0 - 0.2 thou/uL   Immunoglobulin Free Light Chains, Serum   Result Value Ref Range    Kappa Free Lt Chain 19.65 (H) 0.33 - 1.94 mg/dL    Lambda Free Lt Chain 7.00 (H) 0.57 - 2.63 mg/dL    Kappa Lambda Ratio 2.81 (H) 0.26 - 1.65         Imaging Results     No results found.      Denys Anthony MD

## 2021-06-16 NOTE — PROGRESS NOTES
"In clinic device check.  Please see link for full device report.  Patient was informed of results and next follow up during today's visit.     Type: Routine in-clinic IPG check for battery status.  Presenting Rhythm: Ventricular pacing at 60 bpm.  Lead/Battery status: Stable lead and battery measurements. Magnet rate 90 bpm, estimated remaing battery longevity remains at <0.5 years.  Arrhythmias: No ventricular high rate detections. Patient is in atrial fibrillation per surface lead, permanent per history.  Anticoagulant: rivaroxaban  Comments: Normal device function. Patient reports, \"getting fatigued when I walk.  It seems more so lately.\" Patient indicates he has \"taken more\" Lasix lately. Heart rate histograms show blunting considering his activity level; reprogrammed Minute Ventilation High Rate Break Point from 110 to 100 bpm for better rate response. Patient states he is due to see Dr. Estrada in July 2018. Routed to Dr. Estrada for review.   Leonor Hurst, RN, MA  Device Nurse  [facility]      "

## 2021-06-17 NOTE — ANESTHESIA POSTPROCEDURE EVALUATION
Patient: Eliana Bethea  COLONOSCOPY  Anesthesia type: MAC    Patient location: Phase II Recovery  Last vitals:   Vitals:    03/29/18 1140   BP: 151/78   Pulse: 60   Resp: 18   Temp: 36.3  C (97.4  F)   SpO2: 100%     Post vital signs: stable  Level of consciousness: awake, alert and oriented  Post-anesthesia pain: pain controlled  Post-anesthesia nausea and vomiting: no  Pulmonary: unassisted, return to baseline  Cardiovascular: stable and blood pressure at baseline  Hydration: adequate  Anesthetic events: no    QCDR Measures:  ASA# 11 - Ana-op Cardiac Arrest: ASA11B - Patient did NOT experience unanticipated cardiac arrest  ASA# 12 - Ana-op Mortality Rate: ASA12B - Patient did NOT die  ASA# 13 - PACU Re-Intubation Rate: NA - No ETT / LMA used for case  ASA# 10 - Composite Anes Safety: ASA10A - No serious adverse event    Additional Notes:

## 2021-06-17 NOTE — ANESTHESIA CARE TRANSFER NOTE
Last vitals:   Vitals:    03/29/18 1059   BP: 101/64   Pulse: 60   Resp: 18   Temp: 36.3  C (97.3  F)   SpO2: 100%     Patient's level of consciousness is drowsy  Spontaneous respirations: yes  Maintains airway independently: yes  Dentition unchanged: yes  Oropharynx: oropharynx clear of all foreign objects    QCDR Measures:  ASA# 20 - Surgical Safety Checklist: WHO surgical safety checklist completed prior to induction  PQRS# 430 - Adult PONV Prevention: NA - Not adult patient, not GA or 3 or more risk factors NOT present  ASA# 8 - Peds PONV Prevention: NA - Not pediatric patient, not GA or 2 or more risk factors NOT present  PQRS# 424 - Ana-op Temp Management: 4559F - At least one body temp DOCUMENTED => 35.5C or 95.9F within required timeframe  PQRS# 426 - PACU Transfer Protocol: - Transfer of care checklist used  ASA# 14 - Acute Post-op Pain: ASA14B - Patient did NOT experience pain >= 7 out of 10

## 2021-06-18 NOTE — PROGRESS NOTES
In clinic device check with follow up with Dr. Estrada.  Please see link for full device report.  Patient was informed of results and next follow up during today's visit.

## 2021-06-20 NOTE — PROGRESS NOTES
Date of Service:9/5/2018    Provider's initial consult visit:  8/22/2018    Chief Complaint:   Chief Complaint   Patient presents with     Wound Check       History:   Patient presents to clinic in regards to his leg ulcer. He reports having a left leg ulcer for about a year. The  patient was last seen by me on 8/22/2018. He continues to complain of pain in his ulcer and left calf at night.  He describes it as a throbbing and deep pain.  He also continues to have a large amount of drainage from it.  He is applying equal parts of Bactroban and Gentamycin with an ABD for absorption.  He is wearing a single layer of Tubigrip for compression.  He had his venous ultrasound completed, which shows deep vein incompetence.    Current Outpatient Prescriptions   Medication Sig Dispense Refill     acetaminophen (TYLENOL) 500 MG tablet 1,000 mg every 6 (six) hours as needed for pain.        amLODIPine (NORVASC) 2.5 MG tablet Take 2.5 mg by mouth 2 (two) times a day.  11     atorvastatin (LIPITOR) 20 MG tablet Take 20 mg by mouth every morning.       carvedilol (COREG) 6.25 MG tablet Take 2 tablets (12.5 mg total) by mouth 2 (two) times a day with meals. (Patient taking differently: Take 6.25 mg by mouth daily. )  0     ciprofloxacin HCl (CIPRO) 500 MG tablet Take 1 tablet (500 mg total) by mouth 2 (two) times a day for 10 days. 20 tablet 0     cyanocobalamin 1000 MCG tablet Take 5,000 mcg by mouth daily.        famotidine (PEPCID) 20 MG tablet Take 20 mg by mouth 2 (two) times a day.       furosemide (LASIX) 40 MG tablet Take 1 tablet (40 mg total) by mouth daily. (Patient taking differently: Take 80 mg by mouth daily. )  0     gentamicin (GARAMYCIN) 0.1 % ointment Apply as instructed to ulcer daily 30 g 1     HYDROcodone-acetaminophen 5-325 mg per tablet Take 1-2 tablets by mouth daily as needed for pain. For abdominal pain       linaclotide (LINZESS) 145 mcg cap capsule Take 145 mcg by mouth daily before breakfast.        lisinopril (PRINIVIL,ZESTRIL) 40 MG tablet Take 40 mg by mouth every morning.        multivitamin therapeutic (THERAGRAN) tablet Take 1 tablet by mouth daily.       mupirocin (BACTROBAN) 2 % ointment Apply as instructed to ulcer daily 30 g 1     propranolol (INDERAL) 10 MG tablet Take 10 mg by mouth 2 (two) times a day as needed. During high stress situations       rivaroxaban (XARELTO) 20 mg Tab Take 20 mg by mouth every morning. Last dose sat 10/31       Current Facility-Administered Medications   Medication Dose Route Frequency Provider Last Rate Last Dose     lidocaine 2 % jelly (XYLOCAINE)   Topical PRN Galilea Nair Benito, CHALO           No Known Allergies    Physical Exam:    Vitals:    09/05/18 0746   BP: 123/59   Pulse: 60   Resp: 20   Temp: 97.7  F (36.5  C)   SpO2: 100%    Body mass index is 30.25 kg/(m^2).    General:  78 y.o. male in no apparent distress.    Head:  Normocephalic atraumatic  Psychiatric: Cooperative.   Respiratory: unlabored breathing.  Cardiovascular-Lower extremity: Edema: slightly firm;  Pulses Using a handheld doppler, the left pulse is dorsalis pedis and posterior tibial pulses, dampened and restrictive and biphasic in nature.    The right DP and PT: Using a handheld doppler the pulse was strong and crisp; non restrictive and biphasic in nature.    Vasc Edema 8/22/2018 9/5/2018   Right just above MTP 25.2 25.5   Right Ankle 26.1 26.5   Right Widest Calf 40.2 29.5   Right Thigh Up 10cm 48.1 49.5   Left - just above MTP 25.7 25.8   Left Ankle 29.6 29   Left Widest Calf 41 45   Left Thigh Up 10cm 49.6 48.8       Integumentary:      Left leg Ulceration(s):   Wound bed: Prior to debridement: 100% adherent slough Undermining no, Tunneling no   Wound Edge:flush and attached   Periwound:  There is no flor wound erythema, warmth  induration, maceration, denudement or fluctuance surrounding the ulcer(s).  Exudate: large  serosanguineous drainage with no odor.  Wound Shape irregular    VASC  Wound 08/22/18 left leg inferior (Active)   Pre Size Length 3 9/5/2018  7:00 AM   Pre Size Width 2.8 9/5/2018  7:00 AM   Pre Size Depth 0.2 9/5/2018  7:00 AM   Pre Total Sq cm 8.4 9/5/2018  7:00 AM   Post Size Length 2.5 9/5/2018  7:00 AM   Post Size Width 2 9/5/2018  7:00 AM       VASC Wound 08/22/18 left leg superior (Active)   Pre Size Length 1 9/5/2018  7:00 AM   Pre Size Width 0.7 9/5/2018  7:00 AM   Pre Size Depth 0.2 9/5/2018  7:00 AM   Pre Total Sq cm 0.7 9/5/2018  7:00 AM   Post Size Length 1 9/5/2018  7:00 AM   Post Size Width 0.7 9/5/2018  7:00 AM       Incision 11/02/15 Knee Right (Active)       Photo:   [facility] [place]  EXAM: BILATERAL LOWER EXTREMITY VENOUS DUPLEX WITH INCOMPETENCY TESTING  8/28/2018 9:23 AM     INDICATION: Bilateral lower extremity pain/swelling and ulcers. Assess for incompetent veins.   TECHNIQUE: Gray-scale, duplex, and spectral Doppler ultrasound done of the bilateral lower extremity veins. Spectral Doppler ultrasound done with provocative maneuvers.   COMPARISON: None     INCOMPETENCY CRITERIA: Deep vein reflux reported when greater than 1,000 ms flow reversal. Superficial vein reflux reported when greater than 600 ms flow reversal.  vein reflux reported as greater than 350 ms flow reversal.     FINDINGS:   RIGHT LOWER EXTREMITY: Normal compressibility of the common femoral, profunda femoris, femoral, popliteal, and visualized posterior tibial veins. Incompetent common femoral, femoral and popliteal veins. Absent great saphenous vein. Competent small   saphenous vein down to the mid calf.     LEFT LOWER EXTREMITY: Normal compressibility of the common femoral, profunda femoris, femoral, popliteal, and visualized posterior tibial veins.Incompetent common femoral, upper profunda femoris, femoral and popliteal veins. Competent great saphenous   vein. Competent small saphenous vein down to the mid calf. Incompetent, 5 mm  of the distal calf, adjacent to  the patient's ulcer. Incompetent, 6 mm distal thigh varicosity. Moderate subcutaneous calf edema.     IMPRESSION:   CONCLUSION:   1.  RIGHT LOWER EXTREMITY: No DVT. Incompetent deep venous system. Absent great saphenous vein.  2.  LEFT LOWER EXTREMITY: No DVT. Incompetent deep venous system. Incompetent distal calf , adjacent to the ulcer. Incompetent distal thigh varicosity.    Assessment:    Images:   [facility]  EXAM: BILATERAL LOWER EXTREMITY VENOUS DUPLEX WITH INCOMPETENCY TESTING  8/28/2018 9:23 AM     INDICATION: Bilateral lower extremity pain/swelling and ulcers. Assess for incompetent veins.   TECHNIQUE: Gray-scale, duplex, and spectral Doppler ultrasound done of the bilateral lower extremity veins. Spectral Doppler ultrasound done with provocative maneuvers.   COMPARISON: None     INCOMPETENCY CRITERIA: Deep vein reflux reported when greater than 1,000 ms flow reversal. Superficial vein reflux reported when greater than 600 ms flow reversal.  vein reflux reported as greater than 350 ms flow reversal.     FINDINGS:   RIGHT LOWER EXTREMITY: Normal compressibility of the common femoral, profunda femoris, femoral, popliteal, and visualized posterior tibial veins. Incompetent common femoral, femoral and popliteal veins. Absent great saphenous vein. Competent small   saphenous vein down to the mid calf.     LEFT LOWER EXTREMITY: Normal compressibility of the common femoral, profunda femoris, femoral, popliteal, and visualized posterior tibial veins.Incompetent common femoral, upper profunda femoris, femoral and popliteal veins. Competent great saphenous   vein. Competent small saphenous vein down to the mid calf. Incompetent, 5 mm  of the distal calf, adjacent to the patient's ulcer. Incompetent, 6 mm distal thigh varicosity. Moderate subcutaneous calf edema.     IMPRESSION:   CONCLUSION:   1.  RIGHT LOWER EXTREMITY: No DVT. Incompetent deep venous system. Absent  great saphenous vein.  2.  LEFT LOWER EXTREMITY: No DVT. Incompetent deep venous system. Incompetent distal calf , adjacent to the ulcer. Incompetent distal thigh varicosity.    Arterial Ultrasound: adequate for healing    Labs:    The following labs were reviewed by me today.    Lab Results   Component Value Date    WBC 4.6 03/09/2018    HGB 12.1 (L) 03/09/2018    HCT 37.6 (L) 03/09/2018     (H) 03/09/2018     03/09/2018               Invalid input(s): EOSPC    Lab Results   Component Value Date    CREATININE 0.89 03/09/2018         Lab Results   Component Value Date    BUN 17 03/09/2018     No results found for: PREALBUMINESUFAST(LABPROT,LABALBU,albumin)@  Invalid input(s): LABALBU  No results found for: PREALBUMIN    No results found for: CRP  No results found for: SEDRATE    No results found for: HGBA1C  Lab Results   Component Value Date    TSH 2.10 08/04/2016           No results found for: TLAOBONL18RQ  Lab Results   Component Value Date     (H) 08/04/2016        1. Local infection of wound     2. Chronic venous insufficiency  Compression stockings   3. Ulcer of left lower extremity with fat layer exposed (H)  Compression stockings   4. CHF (congestive heart failure) (H)         A new wound was identified today: no.    Plan:  1.  Excisional Debridement of the left leg ulcer was recommended and after consent was obtained and topical 2% Xylocaine was applied, under clean conditions, using a #15 scalpel, the devitalized subcutaneous  tissue in the ulcer base and at the ulcer margins were sharply excised for a total sq. cm of 9.1.   Following excision, there was minimal bleeding tissue, which was easily controlled and a decrease in the nonviable tissue.  The patient tolerated the procedure without difficulty.     2.  Left leg ulcer, reviewed the venous an arterial ultrasound tests with him today.  He has significant venous insufficiency, but minimal arterial insufficiency.  Suspect  that he needs increased compression for healing.  He will finish taking his antibiotic as prescribed.  No improvement in ulcer this past week.    3. Venous insufficiency, Has deep vein insufficiency.  Will need increased compression.     4.  Treatment:   Will discontinue equal parts of Bactroban and Gentamycin and start silvercel.  It will be covered with sorbion for increased absorption.  For compression, he will start wearing a double layer of Tubigrip and comprilan.  If he can not wrap his leg with comprilan, he will use just a double layer of Tubigrip.    5.  Patient will follow up with me in 1 week  for reevaluation.          Galilea Oliver, APRN, CNP,  [facility]  [phone]

## 2021-06-20 NOTE — PROGRESS NOTES
Date of Service:9/12/2018    Provider's initial consult visit:  8/22/2018    Chief Complaint:   Chief Complaint   Patient presents with     Wound Check       History:   Patient presents to clinic in regards to his leg ulcer. He reports having a left leg ulcer for about a year. The  patient was last seen by me on 9/5/2018 when silvercel and a comprilan was applied.  He did not tolerate the comprilan so he took it off the same day that it was applied.  He is wearing a double layer of Tubigrip instead and is tolerating it.  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      Current Outpatient Prescriptions   Medication Sig Dispense Refill     acetaminophen (TYLENOL) 500 MG tablet 1,000 mg every 6 (six) hours as needed for pain.        amLODIPine (NORVASC) 2.5 MG tablet Take 2.5 mg by mouth 2 (two) times a day.  11     atorvastatin (LIPITOR) 20 MG tablet Take 20 mg by mouth every morning.       carvedilol (COREG) 6.25 MG tablet Take 2 tablets (12.5 mg total) by mouth 2 (two) times a day with meals. (Patient taking differently: Take 6.25 mg by mouth daily. )  0     cyanocobalamin 1000 MCG tablet Take 5,000 mcg by mouth daily.        famotidine (PEPCID) 20 MG tablet Take 20 mg by mouth 2 (two) times a day.       furosemide (LASIX) 40 MG tablet Take 1 tablet (40 mg total) by mouth daily. (Patient taking differently: Take 80 mg by mouth daily. )  0     gentamicin (GARAMYCIN) 0.1 % ointment Apply as instructed to ulcer daily 30 g 1     HYDROcodone-acetaminophen 5-325 mg per tablet Take 1-2 tablets by mouth daily as needed for pain. For abdominal pain       linaclotide (LINZESS) 145 mcg cap capsule Take 145 mcg by mouth daily before breakfast.       lisinopril (PRINIVIL,ZESTRIL) 40 MG tablet Take 40 mg by mouth every morning.        multivitamin therapeutic (THERAGRAN) tablet Take 1 tablet by mouth daily.       mupirocin (BACTROBAN) 2 % ointment Apply as  instructed to ulcer daily 30 g 1     propranolol (INDERAL) 10 MG tablet Take 10 mg by mouth 2 (two) times a day as needed. During high stress situations       rivaroxaban (XARELTO) 20 mg Tab Take 20 mg by mouth every morning. Last dose sat 10/31       Current Facility-Administered Medications   Medication Dose Route Frequency Provider Last Rate Last Dose     lidocaine 2 % jelly (XYLOCAINE)   Topical PRN Galilea Nair Benito, CHALO           No Known Allergies    Physical Exam:    Vitals:    09/12/18 1345   BP: 115/58   Pulse: (!) 59   Temp: 97.5  F (36.4  C)    There is no height or weight on file to calculate BMI.    General:  78 y.o. male in no apparent distress.    Head:  Normocephalic atraumatic  Psychiatric: Cooperative.   Respiratory: unlabored breathing.  Cardiovascular-Lower extremity: Edema: slightly firm;  Pulses Using a handheld doppler, the left pulse is dorsalis pedis and posterior tibial pulses, dampened and restrictive and biphasic in nature, 8/22/2018.    The right DP and PT: Using a handheld doppler the pulse was strong and crisp; non restrictive and biphasic in nature, 8/22/2018    Vasc Edema 8/22/2018 9/5/2018   Right just above MTP 25.2 25.5   Right Ankle 26.1 26.5   Right Widest Calf 40.2 29.5   Right Thigh Up 10cm 48.1 49.5   Left - just above MTP 25.7 25.8   Left Ankle 29.6 29   Left Widest Calf 41 45   Left Thigh Up 10cm 49.6 48.8       Integumentary:      Left leg Ulceration(s):   Wound bed: Prior to debridement: 100% adherent slough Undermining no, Tunneling no   Wound Edge:attached  Periwound:  There is no flor wound erythema, warmth  induration, maceration, denudement or fluctuance surrounding the ulcer(s).  Exudate: large  serosanguineous drainage with no odor.  Wound Shape irregular    VASC Wound 08/22/18 left leg inferior (Active)   Pre Size Length 2 9/12/2018  1:00 PM   Pre Size Width 1 9/12/2018  1:00 PM   Pre Size Depth 0.2 9/12/2018  1:00 PM   Pre Total Sq cm 2 9/12/2018  1:00 PM    Post Size Length 1.8 9/12/2018  1:00 PM   Post Size Width 0.8 9/12/2018  1:00 PM       VASC Wound 08/22/18 left leg superior (Active)   Pre Size Length 0.5 9/12/2018  1:00 PM   Pre Size Width 0.5 9/12/2018  1:00 PM   Pre Size Depth 0.2 9/12/2018  1:00 PM   Pre Total Sq cm 0.25 9/12/2018  1:00 PM   Post Size Length 0.5 9/12/2018  1:00 PM   Post Size Width 0.5 9/12/2018  1:00 PM       Incision 11/02/15 Knee Right (Active)       Photo:   [facility] VASCULAR [place]  EXAM: BILATERAL LOWER EXTREMITY VENOUS DUPLEX WITH INCOMPETENCY TESTING  8/28/2018 9:23 AM     INDICATION: Bilateral lower extremity pain/swelling and ulcers. Assess for incompetent veins.   TECHNIQUE: Gray-scale, duplex, and spectral Doppler ultrasound done of the bilateral lower extremity veins. Spectral Doppler ultrasound done with provocative maneuvers.   COMPARISON: None     INCOMPETENCY CRITERIA: Deep vein reflux reported when greater than 1,000 ms flow reversal. Superficial vein reflux reported when greater than 600 ms flow reversal.  vein reflux reported as greater than 350 ms flow reversal.     FINDINGS:   RIGHT LOWER EXTREMITY: Normal compressibility of the common femoral, profunda femoris, femoral, popliteal, and visualized posterior tibial veins. Incompetent common femoral, femoral and popliteal veins. Absent great saphenous vein. Competent small   saphenous vein down to the mid calf.     LEFT LOWER EXTREMITY: Normal compressibility of the common femoral, profunda femoris, femoral, popliteal, and visualized posterior tibial veins.Incompetent common femoral, upper profunda femoris, femoral and popliteal veins. Competent great saphenous   vein. Competent small saphenous vein down to the mid calf. Incompetent, 5 mm  of the distal calf, adjacent to the patient's ulcer. Incompetent, 6 mm distal thigh varicosity. Moderate subcutaneous calf edema.     IMPRESSION:   CONCLUSION:   1.  RIGHT LOWER EXTREMITY: No DVT. Incompetent deep  venous system. Absent great saphenous vein.  2.  LEFT LOWER EXTREMITY: No DVT. Incompetent deep venous system. Incompetent distal calf , adjacent to the ulcer. Incompetent distal thigh varicosity.    Assessment:    Images:   [facility] VASCULAR [place]  EXAM: BILATERAL LOWER EXTREMITY VENOUS DUPLEX WITH INCOMPETENCY TESTING  8/28/2018 9:23 AM     INDICATION: Bilateral lower extremity pain/swelling and ulcers. Assess for incompetent veins.   TECHNIQUE: Gray-scale, duplex, and spectral Doppler ultrasound done of the bilateral lower extremity veins. Spectral Doppler ultrasound done with provocative maneuvers.   COMPARISON: None     INCOMPETENCY CRITERIA: Deep vein reflux reported when greater than 1,000 ms flow reversal. Superficial vein reflux reported when greater than 600 ms flow reversal.  vein reflux reported as greater than 350 ms flow reversal.     FINDINGS:   RIGHT LOWER EXTREMITY: Normal compressibility of the common femoral, profunda femoris, femoral, popliteal, and visualized posterior tibial veins. Incompetent common femoral, femoral and popliteal veins. Absent great saphenous vein. Competent small   saphenous vein down to the mid calf.     LEFT LOWER EXTREMITY: Normal compressibility of the common femoral, profunda femoris, femoral, popliteal, and visualized posterior tibial veins.Incompetent common femoral, upper profunda femoris, femoral and popliteal veins. Competent great saphenous   vein. Competent small saphenous vein down to the mid calf. Incompetent, 5 mm  of the distal calf, adjacent to the patient's ulcer. Incompetent, 6 mm distal thigh varicosity. Moderate subcutaneous calf edema.     IMPRESSION:   CONCLUSION:   1.  RIGHT LOWER EXTREMITY: No DVT. Incompetent deep venous system. Absent great saphenous vein.  2.  LEFT LOWER EXTREMITY: No DVT. Incompetent deep venous system. Incompetent distal calf , adjacent to the ulcer. Incompetent distal thigh  varicosity.    Arterial Ultrasound: adequate for healing    Labs:    The following labs were reviewed by me today.    Lab Results   Component Value Date    WBC 4.6 03/09/2018    HGB 12.1 (L) 03/09/2018    HCT 37.6 (L) 03/09/2018     (H) 03/09/2018     03/09/2018               Invalid input(s): EOSPC    Lab Results   Component Value Date    CREATININE 0.89 03/09/2018         Lab Results   Component Value Date    BUN 17 03/09/2018     No results found for: PREALBUMINESUFAST(LABPROT,LABALBU,albumin)@  Invalid input(s): LABALBU  No results found for: PREALBUMIN    No results found for: CRP  No results found for: SEDRATE    No results found for: HGBA1C  Lab Results   Component Value Date    TSH 2.10 08/04/2016           No results found for: [id number]  Lab Results   Component Value Date     (H) 08/04/2016        1. Ulcer of left lower extremity with fat layer exposed (H)     2. Chronic venous insufficiency     3. Local infection of wound     4. Anemia         A new wound was identified today: no.    Plan:  1.  Excisional Debridement of the left leg ulcer was recommended and after consent was obtained and topical 2% Xylocaine was applied, under clean conditions, using a #15 scalpel, the devitalized subcutaneous  tissue in the ulcer base and at the ulcer margins were sharply excised for a total sq. cm of 2.25.   Following excision, there was minimal bleeding tissue, which was easily controlled and a decrease in the nonviable tissue.  The patient tolerated the procedure without difficulty.     2.  Left leg ulcer, improving    3. Venous insufficiency, Has deep vein insufficiency.  He did not tolerate the comprilan because it was to uncomfortable.  He will continue with the double layer of Tubigrip until his Velcro compression wraps arrive.      4.  Treatment:   Will continue with silvercel.  It will be covered with sorbion for increased absorption.  For compression, he will use just a double layer of  Tubigrip.    5.  Patient will follow up with me in 2 week  for reevaluation.          Galilea Oliver, APRN, CNP,  [facility] [place]  [phone]

## 2021-06-20 NOTE — PROGRESS NOTES
[facility] Consult Note    Date of Service:  8/22/2018    Requesting Provider: Moshe Corlye MD    History of Present Illness:     Moshe Corley MD referred Eliana Bethea for his leg ulcer.   The patient presents to clinic alone.  He reports having a left leg ulcer for about a year.  He applies either SSD or leaves it open to air.  He wears athletic socks for compression.  The drainage and pain has gotten worse over the past three months.  He describes the pain as a deep pain or a shooting pain from his groin down to his ulcer.  He also has swelling in his legs with the Right>Left.  The swelling is worse in the evening.  He also complains of fatigued legs after walking 3 blocks, which is a change from his ability to walk 10 miles.      Review of Symptoms:    GI:  Appetite is good,   Taking Nutritional supplements: No  Weight:  intentional loss over 20 pounds in last couple of months.    Cardiovascular:  denies Chest pain or discomfort;         Edema: There is moderate  edema noted in bilateral LE.  Worse in the evening.    Respiratory:  denies unusual shortness of breath    Bowels: reports constipation.  On medication    : No concerns.     MSK: Patient is ambulatory; does not use an assistive device.  Fatigued legs after walking 3 blocks.    Neurological:   reports tingling on the bottom of his feet when he urinates.    Endocrine: is not diabetic     All other systems were reviewed are not pertinent to the presenting problem.    Past Medical History:    Past Medical History:   Diagnosis Date     AAA (abdominal aortic aneurysm) (H)      Alcohol dependence (H)     last heavy drinking was 4 yrs 4months ago, occasional lite beer since     Anemia      Aphakia      Arrhythmia      Arthritis      Atrial fibrillation, chronic (H)      CHF (congestive heart failure) (H)      Coronary artery disease      Depression      Dermatitis      Diverticulosis     severe     Gout      History of anesthesia  complications     hallucinations, bad dreams     Hyperlipidemia      Hypertension      Liver cell damage      Pacemaker     Massena Sci        Surgical History:   Past Surgical History:   Procedure Laterality Date     APPENDECTOMY  1966     CARDIAC CATHETERIZATION       CAROTID ENDARTERECTOMY       COLONOSCOPY N/A 3/29/2018    Procedure: COLONOSCOPY;  Surgeon: Delano Morales MD;  Location: [facility] Main OR;  Service:      HERNIA REPAIR       INSERT / REPLACE / REMOVE PACEMAKER      [employer]ena Sci     JOINT REPLACEMENT      knee/shoulder     WA RECONSTR TOTAL SHOULDER IMPLANT Right 5/1/2015    Procedure: RIGHT TOTAL SHOULDER  ARTHROPLASTY;  Surgeon: True Espinoza MD;  Location: [facility] OR;  Service: Orthopedics     WA TOTAL KNEE ARTHROPLASTY Right 11/2/2015    Procedure: #3   KNEE TOTAL ARTHROPLASTY, RIGHT;  Surgeon: True Espinoza MD;  Location: [facility] OR;  Service: Orthopedics       Allergies: No Known Allergies       Medications:    Current Outpatient Prescriptions:      acetaminophen (TYLENOL) 500 MG tablet, 1,000 mg every 6 (six) hours as needed for pain. , Disp: , Rfl:      amLODIPine (NORVASC) 2.5 MG tablet, Take 2.5 mg by mouth 2 (two) times a day., Disp: , Rfl: 11     atorvastatin (LIPITOR) 20 MG tablet, Take 20 mg by mouth every morning., Disp: , Rfl:      carvedilol (COREG) 6.25 MG tablet, Take 2 tablets (12.5 mg total) by mouth 2 (two) times a day with meals. (Patient taking differently: Take 6.25 mg by mouth daily. ), Disp: , Rfl: 0     cyanocobalamin 1000 MCG tablet, Take 5,000 mcg by mouth daily. , Disp: , Rfl:      famotidine (PEPCID) 20 MG tablet, Take 20 mg by mouth 2 (two) times a day., Disp: , Rfl:      furosemide (LASIX) 40 MG tablet, Take 1 tablet (40 mg total) by mouth daily. (Patient taking differently: Take 80 mg by mouth daily. ), Disp: , Rfl: 0     HYDROcodone-acetaminophen 5-325 mg per tablet, Take 1-2 tablets by mouth daily as needed for pain. For abdominal pain,  "Disp: , Rfl:      linaclotide (LINZESS) 145 mcg cap capsule, Take 145 mcg by mouth daily before breakfast., Disp: , Rfl:      lisinopril (PRINIVIL,ZESTRIL) 40 MG tablet, Take 40 mg by mouth every morning. , Disp: , Rfl:      multivitamin therapeutic (THERAGRAN) tablet, Take 1 tablet by mouth daily., Disp: , Rfl:      propranolol (INDERAL) 10 MG tablet, Take 10 mg by mouth 2 (two) times a day as needed. During high stress situations, Disp: , Rfl:      rivaroxaban (XARELTO) 20 mg Tab, Take 20 mg by mouth every morning. Last dose sat 10/31, Disp: , Rfl:      gentamicin (GARAMYCIN) 0.1 % ointment, Apply as instructed to ulcer daily, Disp: 30 g, Rfl: 1     mupirocin (BACTROBAN) 2 % ointment, Apply as instructed to ulcer daily, Disp: 30 g, Rfl: 1    Current Facility-Administered Medications:      lidocaine 2 % jelly (XYLOCAINE), , Topical, PRN, Galilea Oliver, CNP    Family history:   Family History   Problem Relation Age of Onset     Heart disease Father      No Medical Problems Sister      No Medical Problems Brother      No Medical Problems Sister      No Medical Problems Sister      No Medical Problems Sister      Clotting disorder Neg Hx      Anesthesia problems Neg Hx          Social History:   Social History     Social History     Marital status:      Spouse name: N/A     Number of children: 0     Years of education: N/A     Occupational History     Artist      Social History Main Topics     Smoking status: Never Smoker     Smokeless tobacco: Never Used     Alcohol use Yes      Comment: last heavy drinking was 4 yr 4months ago, occ lite beer since     Drug use: No     Sexual activity: No     Other Topics Concern     Not on file     Social History Narrative    Lives alone        Physical Exam    General: This is a 78 y.o. male who appears their reported age, not in acute distress    Constitution:  Vital Signs: BP 99/55  Temp 97.7  F (36.5  C) (Oral)   Resp 16  Ht 6' 3\" (1.905 m) Comment: per pt " report  Wt (!) 244 lb (110.7 kg) Comment: per pt report  BMI 30.5 kg/m2 Body mass index is 30.5 kg/(m^2).    Psychiatric: Alert and oriented X 3, in no apparent distress. Calm and cooperative throughout exam    Head/Neck:  Normocephalic, atraumatic    Cardiovascular:  Rate and Rhythm is regular    Respiratory:  Lungs are clear to auscultation in all fields bilaterally.     Abdomen:  Normal bowel sounds. Soft, symmetric, no guarding or rigidity, non tender with palpation.  No organomegaly or masses palpated.     Integumentary/Hair/Nails:  Turgor and texture are normal.  Nails are normal.    MSK:  BilateralNo ROM deficit in foot/feet    Groin Lymphatics: No inguinal lymphadenopathy:    Neurological:  Grossly intact. Lower extremity sensation:      Vascular:    Arterial:    Femoral:  strong and equal bilaterally.   Using a handheld doppler, the left pulse is dorsalis pedis and posterior tibial pulses, dampened and restrictive and biphasic in nature.    The right DP and PT: Using a handheld doppler the pulse was strong and crisp; non restrictive and biphasic in nature.            Venous:  There is venous distention on theBilateral legs.  There is telangiectasias and hyperpigmentation on the Bilateral lower extremity    There is moderate pitting edema noted in Left lower leg.  Stemmers sign negative.      Circumferential volume measures:    Vasc Edema 8/22/2018   Right just above MTP 25.2   Right Ankle 26.1   Right Widest Calf 40.2   Right Thigh Up 10cm 48.1   Left - just above MTP 25.7   Left Ankle 29.6   Left Widest Calf 41   Left Thigh Up 10cm 49.6       Ulceration(s)/Wound(s):     Let Leg Ulceration(s):     Wound bed: %50 loose slough and %50 adherent slough          Undermining no, Tunneling no   Wound Edge: attached   Periwound:  There is flor wound, but no erythema, induration, maceration, denudement fluctuance or warmth surrounding the ulcer(s).  Exudate: moderate serosanguineous    Odor:  absent   Wound Shape  irregular    VASC Wound 08/22/18 left leg inferior (Active)   Pre Size Length 2 8/22/2018  7:00 AM   Pre Size Width 3 8/22/2018  7:00 AM   Pre Size Depth 0.3 8/22/2018  7:00 AM   Pre Total Sq cm 6 8/22/2018  7:00 AM       VASC Wound 08/22/18 left leg superior (Active)   Pre Size Length 1.6 8/22/2018  7:00 AM   Pre Size Width 1 8/22/2018  7:00 AM   Pre Size Depth 0.2 8/22/2018  7:00 AM   Pre Total Sq cm 1.6 8/22/2018  7:00 AM       Incision 11/02/15 Knee Right (Active)       Vascular Grade/Stage of Ulcer:      6. Varicose veins and an open venous ulceration    Laboratory studies:     Lab Results   Component Value Date    WBC 4.6 03/09/2018    HGB 12.1 (L) 03/09/2018    HCT 37.6 (L) 03/09/2018     (H) 03/09/2018     03/09/2018     Lab Results   Component Value Date    CREATININE 0.89 03/09/2018     Lab Results   Component Value Date    BUN 17 03/09/2018     No results found for: CRP  No results found for: SEDRATE  Lab Results   Component Value Date    ALBUMIN 3.6 03/09/2018     No results found for: HGBA1C    Lab Results   Component Value Date    TSH 2.10 08/04/2016         Lab Results   Component Value Date     (H) 08/04/2016     Results for orders placed or performed in visit on 03/09/18   Comprehensive Metabolic Panel   Result Value Ref Range    Sodium 141 136 - 145 mmol/L    Potassium 4.7 3.5 - 5.0 mmol/L    Chloride 104 98 - 107 mmol/L    CO2 26 22 - 31 mmol/L    Anion Gap, Calculation 11 5 - 18 mmol/L    Glucose 92 70 - 125 mg/dL    BUN 17 8 - 28 mg/dL    Creatinine 0.89 0.70 - 1.30 mg/dL    GFR MDRD Af Amer >60 >60 mL/min/1.73m2    GFR MDRD Non Af Amer >60 >60 mL/min/1.73m2    Bilirubin, Total 0.9 0.0 - 1.0 mg/dL    Calcium 9.0 8.5 - 10.5 mg/dL    Protein, Total 7.6 6.0 - 8.0 g/dL    Albumin 3.6 3.5 - 5.0 g/dL    Alkaline Phosphatase 114 45 - 120 U/L    AST 21 0 - 40 U/L    ALT 20 0 - 45 U/L     No results found for: [id number]    Imaging:  None pertinent     Assessment:  1. Ulcer of left  lower extremity with fat layer exposed (H)  mupirocin (BACTROBAN) 2 % ointment    gentamicin (GARAMYCIN) 0.1 % ointment   2. Anemia     3. Liver cell damage     4. CHF (congestive heart failure) (H)     5. Atrial fibrillation, chronic (H)     6. Chronic venous insufficiency  US Venous Insufficiency Legs Bilateral    Change dressing   7. PAD (peripheral artery disease) (H)  US Arterial Pressures With Exercise Legs Bilateral       Assessment/Plan:  1.  Excisional Debridement of the left leg ulcer was recommended and after consent was obtained and topical 2% Xylocaine was applied, under clean conditions, using a #15 scalpel, the devitalized subcutaneous  tissue in the ulcer base and at the ulcer margins were sharply excised for a total sq. cm of 6.0.    Following excision, there was minimal bleeding tissue, which was easily controlled and a decrease in the nonviable tissue.  The patient tolerated the procedure without difficulty.      2. Edema. Multifactorial.  Is on Norvasc and lasix    3.  Obesity. He is currently intentionally losing weight.    4.  Anemia, not on iron.  Managed by primary    5.  Atrial fibrillation, on xarelto.    6.  CHF, on lasix    7.  CAD, will obtain a PAD test    8.  Venous insufficiency with ulcer of the left leg.  Not wearing adequate compression.  Ulcer possibly infected.  Using the Pierre Technique, I obtained an aerobic culture and sensitivity today.  Will treat topically and prescribe an antibiotic after the culture results are available.    9. Nutrition: Encouraged high protein foods and/or nutritional supplements, including protein powder    10. Treatment Wrote a prescription for Bactroban and gentamycin.  This will be applied on in equal parts and changed daily.  He will start wearing a Tubigrip for compression.  Will consider increasing the compression level following his arterial ultrasound.    11.  Patient to return to clinic in 2 week(s) for reevaluation. Instructions were given  to call the clinic sooner with any further questions or concerns. Answered all questions.      Galilea Oliver APRN, CNP, [facility]  [phone]

## 2021-06-20 NOTE — PROGRESS NOTES
Date of Service:9/26/2018    Provider's initial consult visit:  8/22/2018    Chief Complaint:   Chief Complaint   Patient presents with     Wound Check       History:   Patient presents to clinic in regards to his leg ulcer. He reports having a left leg ulcer for about a year. The  patient was last seen by me on 9/5/2018 when silvercel and a comprilan was applied.  He did not tolerate the comprilan so he took it off the same day that it was applied.  He is wearing compression stockings that he had at home.  He is not sure of the compression level, but is not interested in a higher level.  However, he is open to using the velcro compression wraps, which have not arrived.  He is applying silvercel in the ulcer and covering with an ABD.  The drainage level is less.  He is not having any pain in his ulcer or leg, but is reporting gradual increase in pain in his left hip, lower abdomin that radiates to his groin.  He believes that it improves when he eats.  He is taking a lot of pain medication because of the discomfort.  He is frustrated with the continuation of the pain and the lack of a diagnosis.  The PAD exam was reviewed with him.      Current Outpatient Prescriptions   Medication Sig Dispense Refill     acetaminophen (TYLENOL) 500 MG tablet 1,000 mg every 6 (six) hours as needed for pain.        amLODIPine (NORVASC) 2.5 MG tablet Take 2.5 mg by mouth 2 (two) times a day.  11     atorvastatin (LIPITOR) 20 MG tablet Take 20 mg by mouth every morning.       carvedilol (COREG) 6.25 MG tablet Take 2 tablets (12.5 mg total) by mouth 2 (two) times a day with meals. (Patient taking differently: Take 6.25 mg by mouth daily. )  0     cyanocobalamin 1000 MCG tablet Take 5,000 mcg by mouth daily.        famotidine (PEPCID) 20 MG tablet Take 20 mg by mouth 2 (two) times a day.       furosemide (LASIX) 40 MG tablet Take 1 tablet (40 mg total) by mouth daily. (Patient taking differently: Take 80 mg by mouth daily. )  0      gentamicin (GARAMYCIN) 0.1 % ointment Apply as instructed to ulcer daily 30 g 1     HYDROcodone-acetaminophen 5-325 mg per tablet Take 1-2 tablets by mouth daily as needed for pain. For abdominal pain       linaclotide (LINZESS) 145 mcg cap capsule Take 145 mcg by mouth daily before breakfast.       lisinopril (PRINIVIL,ZESTRIL) 40 MG tablet Take 40 mg by mouth every morning.        multivitamin therapeutic (THERAGRAN) tablet Take 1 tablet by mouth daily.       mupirocin (BACTROBAN) 2 % ointment Apply as instructed to ulcer daily 30 g 1     propranolol (INDERAL) 10 MG tablet Take 10 mg by mouth 2 (two) times a day as needed. During high stress situations       ranitidine (ZANTAC) 300 MG tablet   1     rivaroxaban (XARELTO) 20 mg Tab Take 20 mg by mouth every morning. Last dose sat 10/31       SSD 1 % cream LALO EXT AA BID FOR 7 DAYS  1     Current Facility-Administered Medications   Medication Dose Route Frequency Provider Last Rate Last Dose     lidocaine 2 % jelly (XYLOCAINE)   Topical PRN Galilea Oliver, CHALO           No Known Allergies    Physical Exam:    There were no vitals filed for this visit. There is no height or weight on file to calculate BMI.    General:  78 y.o. male in no apparent distress.    Head:  Normocephalic atraumatic  Psychiatric: Cooperative.   Respiratory: unlabored breathing.  Cardiovascular-Lower extremity: Edema: slightly firm;  Pulses Using a handheld doppler, the left pulse is dorsalis pedis and posterior tibial pulses, dampened and restrictive and biphasic in nature, 8/22/2018.    The right DP and PT: Using a handheld doppler the pulse was strong and crisp; non restrictive and biphasic in nature, 8/22/2018    Vasc Edema 8/22/2018 9/5/2018   Right just above MTP 25.2 25.5   Right Ankle 26.1 26.5   Right Widest Calf 40.2 29.5   Right Thigh Up 10cm 48.1 49.5   Left - just above MTP 25.7 25.8   Left Ankle 29.6 29   Left Widest Calf 41 45   Left Thigh Up 10cm 49.6 48.8        Integumentary:      Left leg Ulceration(s):   Wound bed: Prior to debridement: 100% fibrin Undermining no, Tunneling no   Wound Edge:attached  Periwound:  There is no flor wound erythema, warmth  induration, maceration, denudement or fluctuance surrounding the ulcer(s).  Exudate: moderate  serosanguineous drainage with no odor.  Wound Shape irregular    VASC Wound 08/22/18 left leg inferior (Active)   Pre Size Length 1.7 9/26/2018 12:00 PM   Pre Size Width 0.9 9/26/2018 12:00 PM   Pre Size Depth 0.2 9/26/2018 12:00 PM   Pre Total Sq cm 1 9/26/2018 12:00 PM   Post Size Length 1 9/26/2018 12:00 PM   Post Size Width 0.2 9/26/2018 12:00 PM       VASC Wound 09/12/18 Left inferior leg towards shin  (Active)   Pre Size Length 1 9/26/2018 12:00 PM   Pre Size Width 0.6 9/26/2018 12:00 PM   Pre Size Depth 0.1 9/26/2018 12:00 PM   Post Size Length 1 9/26/2018 12:00 PM   Post Size Width 0.8 9/26/2018 12:00 PM   Post Size Depth 0.1 9/12/2018  1:00 PM   Post Total Sq cm 0.96 9/12/2018  1:00 PM       Incision 11/02/15 Knee Right (Active)       Assessment:    Images:   [facility] V[facility] [place]  EXAM: BILATERAL LOWER EXTREMITY VENOUS DUPLEX WITH INCOMPETENCY TESTING  8/28/2018 9:23 AM     INDICATION: Bilateral lower extremity pain/swelling and ulcers. Assess for incompetent veins.   TECHNIQUE: Gray-scale, duplex, and spectral Doppler ultrasound done of the bilateral lower extremity veins. Spectral Doppler ultrasound done with provocative maneuvers.   COMPARISON: None     INCOMPETENCY CRITERIA: Deep vein reflux reported when greater than 1,000 ms flow reversal. Superficial vein reflux reported when greater than 600 ms flow reversal.  vein reflux reported as greater than 350 ms flow reversal.     FINDINGS:   RIGHT LOWER EXTREMITY: Normal compressibility of the common femoral, profunda femoris, femoral, popliteal, and visualized posterior tibial veins. Incompetent common femoral, femoral and popliteal veins. Absent  great saphenous vein. Competent small   saphenous vein down to the mid calf.     LEFT LOWER EXTREMITY: Normal compressibility of the common femoral, profunda femoris, femoral, popliteal, and visualized posterior tibial veins.Incompetent common femoral, upper profunda femoris, femoral and popliteal veins. Competent great saphenous   vein. Competent small saphenous vein down to the mid calf. Incompetent, 5 mm  of the distal calf, adjacent to the patient's ulcer. Incompetent, 6 mm distal thigh varicosity. Moderate subcutaneous calf edema.     IMPRESSION:   CONCLUSION:   1.  RIGHT LOWER EXTREMITY: No DVT. Incompetent deep venous system. Absent great saphenous vein.  2.  LEFT LOWER EXTREMITY: No DVT. Incompetent deep venous system. Incompetent distal calf , adjacent to the ulcer. Incompetent distal thigh varicosity.    Arterial Ultrasound: adequate for healing    ABDOMINAL ORGAN DUPLEX VASCULAR ULTRASOUND     8/29/2017 1:04 PM     INDICATIONS: Epigastric pain. Possible mesenteric insufficiency.     TECHNIQUE: Duplex ultrasound with two-dimensional grayscale imaging, and color-flow, Doppler, and spectral analysis.     COMPARISONS: None.     FINDINGS: There is mild aneurysmal dilatation of the infrarenal abdominal aorta, measuring 3.1 cm in greatest dimension. The superior mesenteric artery is patent. There is modest elevation of peak systolic flow velocities (239 cm/s), suggesting mild to   moderate narrowing. The celiac trunk and inferior mesenteric artery are not visualized.     CONCLUSIONS:  1. This is a nondiagnostic study, with nonvisualization of the celiac trunk and inferior mesenteric artery. The superior mesenteric artery is patent. If there is ongoing clinical concern for mesenteric arterial insufficiency, consider further evaluation   with CT angiography.    Labs:    The following labs were reviewed by me today.    Lab Results   Component Value Date    WBC 4.6 03/09/2018    HGB 12.1 (L)  03/09/2018    HCT 37.6 (L) 03/09/2018     (H) 03/09/2018     03/09/2018               Invalid input(s): EOSPC    Lab Results   Component Value Date    CREATININE 0.89 03/09/2018         Lab Results   Component Value Date    BUN 17 03/09/2018     No results found for: PREALBUMINESUFAST(LABPROT,LABALBU,albumin)@  Invalid input(s): LABALBU  No results found for: PREALBUMIN    No results found for: CRP  No results found for: SEDRATE    No results found for: HGBA1C  Lab Results   Component Value Date    TSH 2.10 08/04/2016           No results found for: [id number]  Lab Results   Component Value Date     (H) 08/04/2016        1. Ulcer of left lower extremity with fat layer exposed (H)     2. Chronic venous insufficiency     3. Anemia, unspecified type     4. Arterial insufficiency (H)  Ambulatory referral to Vascular Surgery       A new wound was identified today: no.    Plan:  1.  Excisional Debridement of the left leg ulcer was recommended and after consent was obtained and topical 2% Xylocaine was applied, under clean conditions, using a #15 scalpel, the devitalized subcutaneous  tissue in the ulcer base and at the ulcer margins were sharply excised for a total sq. cm of 1.6.   Following excision, there was minimal bleeding tissue, which was easily controlled and a decrease in the nonviable tissue.  The patient tolerated the procedure without difficulty.     2.  Left leg ulcer, improving    3. Venous insufficiency, Has deep vein insufficiency.  He did not tolerate the comprilan because it was to uncomfortable.  He is wearing his compression stockings, which most likely are less than ideal.  He has not obtained his velcro compression wraps.  We will check on the status.    4.  PAD, will refer to vascular surgeon for further evaluation of his PAD in his legs and possible correlation of his groin and hip pain with arterial insufficiency.    4.  Treatment:   Will discontinue with silvercel and start  layers of viscopaste to increase the moisture.   It will be covered with ABD or sorbion for absorption.  For compression, he will use his compression stockings and use the velcro compression wraps when they arrive.    5.  Patient will follow up with me in 2 week  for reevaluation.          NAHOMI Lr, CNP,  [facility] [place]  [phone]

## 2021-06-25 NOTE — PROGRESS NOTES
Progress Notes by Carmen Estrada MD at 6/9/2017  2:10 PM     Author: Carmen Estrada MD Service: -- Author Type: Physician    Filed: 6/9/2017  2:35 PM Encounter Date: 6/9/2017 Status: Signed    : Carmen Estrada MD (Physician)           Click to link to [facility]     [facility] HEART CARE NOTE    Thank you, Dr. Corley, for asking the [facility] team to see Mr. Eliana Bethea to evaluate chronic atrial fibrillation, AV block and continued leg fatigue.    Assessment/Recommendations   Assessment:    1.  Exertional leg fatigue.  This may be related to blunted heart rate response to activity although cannot definitively exclude coronary artery disease.  His activity threshold sensor was adjusted today in clinic.  I also suggested that we decrease his dose of carvedilol to see if this allows better heart rate response to activity and subsequent improvement in his exertional leg fatigue.  If not, I might recommend CT coronary angiography to exclude the possibility of multivessel disease given his normal nuclear stress studies over the last 3 years.  2.  Coronary artery disease with documented occlusion of the distal right coronary artery on angiography many years ago.  Again nuclear stress study one year ago and 3 years ago was negative for ischemia  3.  Chronic atrial fibrillation, rate controlled.  Device check was performed in the office demonstrate normal function.  He remains on Xarelto to reduce risks of thromboembolic events.  4.  Essential hypertension, controlled    Plan:  1.  Decrease carvedilol to 6.25 mg twice daily  2.  Increase amlodipine to 5 mg twice daily to help with blood pressure given the dropping carvedilol  3.  Follow-up with my clinic in 1 month if continued symptoms of exertional leg fatigue.       History of Present Illness    Mr. Eliana Bethea is a 77 y.o. male with history of known single-vessel coronary artery disease with documented occlusion of the distal right  coronary artery, chronic atrial fibrillation with slow ventricular response necessitating implantation of a permanent pacemaker, chronic alcohol use, essential hypertension and type 2 diabetes mellitus who returns to the clinic today for yearly visit.  He reports significant exertional leg fatigue having difficulty walking any distance.  There is no associated tightening or discomfort in the legs.  He denies any exertional chest discomfort.  He did undergo nuclear stress test in August 2016 which was negative for ischemia or area of infarction.  He denies any symptoms of orthopnea, PND.  He does have some mild chronic lower extremity edema due to venous insufficiency.    ECG (personally reviewed): No ECG performed today.  Device check was performed demonstrating a presenting rhythm of ventricular pacing with underlying atrial fibrillation and a rate of 60.  He is paced 94% of the time in the ventricle.  Blunted histograms were noted.  Lead and battery status stable.  Activity threshold was reprogrammed from medium to medium/low to allow better heart rate response.    ECHO (personally reviewed): Recent echo      Physical Examination Review of Systems   Vitals:    06/09/17 1308   BP: 140/64   Pulse: 60   Resp: 16     Body mass index is 32.37 kg/(m^2).  Wt Readings from Last 3 Encounters:   06/09/17 (!) 259 lb (117.5 kg)   06/01/16 (!) 268 lb (121.6 kg)   05/12/16 (!) 275 lb (124.7 kg)     General Appearance:   Awake, Alert, No acute distress.   HEENT:  No scleral icterus; the mucous membranes were pink and moist.   Neck: No cervical bruits or jugular venous distention    Chest: The spine was straight. The chest was symmetric.   Lungs:   Respirations unlabored; the lungs are clear to auscultation. No wheezing   Cardiovascular:    regular rate and rhythm.  S1, S2 normal.  No murmur, gallop or rub    Abdomen:  No organomegaly, masses, bruits, or tenderness. Bowels sounds are present   Extremities:  1+ pitting edema both  lower extremities.  Large varicose veins noted.     Skin: No xanthelasma. Warm, Dry.   Musculoskeletal: No tenderness.   Neurologic: Mood and affect are appropriate.    General: Night Sweats  Eyes: WNL  Ears/Nose/Throat: WNL  Lungs: WNL  Heart: WNL  Stomach: WNL  Bladder: WNL  Muscle/Joints: WNL  Skin: WNL  Nervous System: WNL  Mental Health: WNL     Blood: WNL     Medical History  Surgical History Family History Social History   Past Medical History:   Diagnosis Date   ? AAA (abdominal aortic aneurysm)    ? Alcohol dependence    ? Anemia    ? Aphakia    ? Arrhythmia    ? Arthritis    ? Atrial fibrillation, chronic    ? Coronary artery disease    ? Depression    ? Dermatitis    ? Gout    ? Hyperlipidemia    ? Hypertension    ? Liver cell damage     Past Surgical History:   Procedure Laterality Date   ? CARDIAC CATHETERIZATION     ? CAROTID ENDARTERECTOMY     ? HERNIA REPAIR     ? INSERT / REPLACE / REMOVE PACEMAKER     ? JOINT REPLACEMENT      knee/shoulder   ? MT RECONSTR TOTAL SHOULDER IMPLANT Right 5/1/2015    Procedure: RIGHT TOTAL SHOULDER  ARTHROPLASTY;  Surgeon: True Espinoza MD;  Location: [facility] OR;  Service: Orthopedics   ? MT TOTAL KNEE ARTHROPLASTY Right 11/2/2015    Procedure: #3   KNEE TOTAL ARTHROPLASTY, RIGHT;  Surgeon: True Espinoza MD;  Location: [facility] OR;  Service: Orthopedics    Family History   Problem Relation Age of Onset   ? Clotting disorder Neg Hx    ? Anesthesia problems Neg Hx     Social History     Social History   ? Marital status:      Spouse name: N/A   ? Number of children: N/A   ? Years of education: N/A     Occupational History   ? Not on file.     Social History Main Topics   ? Smoking status: Never Smoker   ? Smokeless tobacco: Never Used   ? Alcohol use 0.0 oz/week     3 - 6 Cans of beer per week      Comment: 3-4 times per week   ? Drug use: No   ? Sexual activity: Not on file     Other Topics Concern   ? Not on file     Social History Narrative           Medications  Allergies   Current Outpatient Prescriptions   Medication Sig Dispense Refill   ? acetaminophen (TYLENOL) 500 MG tablet as needed for pain.     ? amLODIPine (NORVASC) 5 MG tablet Take 1 tablet (5 mg total) by mouth daily. (Patient taking differently: Take 2.5 mg by mouth 2 (two) times a day. ) 30 tablet 3   ? ascorbic acid, vitamin C, (VITAMIN C) 1000 MG tablet Take 1,000 mg by mouth daily.     ? atorvastatin (LIPITOR) 20 MG tablet Take 20 mg by mouth every morning.     ? carvedilol (COREG) 6.25 MG tablet Take 2 tablets (12.5 mg total) by mouth 2 (two) times a day with meals.  0   ? furosemide (LASIX) 40 MG tablet Take 1 tablet (40 mg total) by mouth daily.  0   ? lisinopril (PRINIVIL,ZESTRIL) 40 MG tablet Take 40 mg by mouth every morning.      ? multivitamin therapeutic (THERAGRAN) tablet Take 1 tablet by mouth daily.     ? omeprazole (PRILOSEC) 20 MG capsule Take 20 mg by mouth every morning.      ? rivaroxaban (XARELTO) 20 mg Tab Take 20 mg by mouth every morning. Last dose sat 10/31       No current facility-administered medications for this visit.       No Known Allergies      Lab Results    Chemistry/lipid CBC Cardiac Enzymes/BNP/TSH/INR   Lab Results   Component Value Date    CREATININE 0.98 08/08/2016    BUN 24 08/08/2016    K 5.2 (H) 08/08/2016     08/08/2016     08/08/2016    CO2 20 (L) 08/08/2016    Lab Results   Component Value Date    WBC 6.2 10/13/2015    HGB 9.5 (L) 11/04/2015    HCT 38.7 (L) 10/13/2015     (H) 10/13/2015     10/13/2015    Lab Results   Component Value Date     (H) 08/04/2016    TSH 2.10 08/04/2016    INR 1.51 (H) 11/04/2015

## 2021-06-26 NOTE — PROGRESS NOTES
Progress Notes by Carmen Estrada MD at 6/6/2018 11:30 AM     Author: Carmen Estrada MD Service: -- Author Type: Physician    Filed: 6/6/2018 12:25 PM Encounter Date: 6/6/2018 Status: Signed    : Carmen Estrada MD (Physician)           Click to link to [facility]     [facility] HEART CARE NOTE    Thank you, Dr. Corley, for asking the [facility] team to see Mr. Eliana Bethea to evaluate exertional leg fatigue.    Assessment/Recommendations   Assessment:    1.  Exertional leg fatigue, etiology unclear.  Eliana does have tendency for lower extremity edema due in part to venous insufficiency as well as some element of right heart failure.  Unclear whether his current symptoms are related to this.  Since it has been 2 years since his last echocardiogram, I suggested we proceed with a repeat study to see if there has been interval change.  He denies any left heart failure symptoms such as orthopnea or PND.  We also talked about consideration of a stress study, but he told me he is not interested at this point.  2.  Chronic atrial fibrillation status post permanent pacemaker implant for slow ventricular response.  Pacer check today demonstrates normal device operation.  He has less than a year of battery time so we will continue to follow on a monthly basis.  He remains on anticoagulation to minimize risks of thromboembolic events.  3.  Coronary artery disease with documented chronic occlusion of the right coronary artery.  Again patient does not want to pursue stress testing    Plan:  1.  Continue current medications   2.  Arrange outpatient echo with further recommendations to follow  3.  Continue monthly checks on battery status       History of Present Illness    Mr. Eliana Bethea is a 78 y.o. male with history of single-vessel coronary artery disease with known occlusion of the right coronary artery, chronic atrial fibrillation status post permanent pacemaker insertion for slow ventricular  response, essential hypertension, chronic diastolic heart failure alcohol use who presents to the office today for yearly visit.  His main complaint today is that of exertional leg fatigue.  He denies any exertional dyspnea or chest discomfort.  No orthopnea, PND.  He does have chronic lower extremity edema which is actually better today.  He tells me these symptoms were fairly significant a week or so ago.  He drinks 64 ounces of 3/2 beer and felt much better and was able to walk back to his apartment.  He questions whether his heart may be getting weaker, but I told him that if this was occurring, he would be developing shortness of breath and orthopneic symptoms which he continues to deny.    ECG (personally reviewed): No ECG today.  A pacemaker check was performed in conjunction with his visit demonstrating chronic atrial fibrillation with ventricular pacing at a rate of 60.  He paces 94% of the time in the ventricle.  No ventricular arrhythmias.    Cardiac Imaging Studies (personally reviewed): No recent imaging     Physical Examination Review of Systems   Vitals:    06/06/18 1049   BP: 102/48   Pulse: 60   Resp: 18     Body mass index is 32 kg/(m^2).  Wt Readings from Last 3 Encounters:   06/06/18 (!) 256 lb (116.1 kg)   05/15/18 (!) 262 lb (118.8 kg)   04/16/18 (!) 267 lb (121.1 kg)     General Appearance:   Awake, Alert, No acute distress.   HEENT:  No scleral icterus; the mucous membranes were pink and moist.   Neck: No cervical bruits or jugular venous distention    Chest: The spine was straight. The chest was symmetric.   Lungs:   Respirations unlabored; the lungs are clear to auscultation. No wheezing   Cardiovascular:    Regular rate and rhythm.  S1, S2 normal.  No murmur, gallop or rub   Abdomen:  No organomegaly, masses, bruits, or tenderness. Bowels sounds are present   Extremities:  Trace to 1+ edema both lower extremities to the lower calf.  Varicose veins noted.   Skin: No xanthelasma. Warm, Dry.    Musculoskeletal: No tenderness.   Neurologic: Mood and affect are appropriate.    General: WNL  Eyes: WNL  Ears/Nose/Throat: WNL  Lungs: WNL  Heart: Leg Swelling  Stomach: WNL  Bladder: WNL  Muscle/Joints: WNL  Skin: WNL  Nervous System: WNL  Mental Health: WNL     Blood: WNL     Medical History  Surgical History Family History Social History   Past Medical History:   Diagnosis Date   ? AAA (abdominal aortic aneurysm)    ? Alcohol dependence     last heavy drinking was 4 yrs 4months ago, occasional lite beer since   ? Anemia    ? Aphakia    ? Arrhythmia    ? Arthritis    ? Atrial fibrillation, chronic    ? CHF (congestive heart failure)    ? Coronary artery disease    ? Depression    ? Dermatitis    ? Diverticulosis     severe   ? Gout    ? History of anesthesia complications     hallucinations, bad dreams   ? Hyperlipidemia    ? Hypertension    ? Liver cell damage    ? Pacemaker     [place] [employer]    Past Surgical History:   Procedure Laterality Date   ? APPENDECTOMY  1966   ? CARDIAC CATHETERIZATION     ? CAROTID ENDARTERECTOMY     ? COLONOSCOPY N/A 3/29/2018    Procedure: COLONOSCOPY;  Surgeon: Delano Morales MD;  Location: [facility] Main OR;  Service:    ? HERNIA REPAIR     ? INSERT / REPLACE / REMOVE PACEMAKER      [place] [employer]   ? JOINT REPLACEMENT      knee/shoulder   ? SD RECONSTR TOTAL SHOULDER IMPLANT Right 5/1/2015    Procedure: RIGHT TOTAL SHOULDER  ARTHROPLASTY;  Surgeon: True Espinoza MD;  Location: [facility] OR;  Service: Orthopedics   ? SD TOTAL KNEE ARTHROPLASTY Right 11/2/2015    Procedure: #3   KNEE TOTAL ARTHROPLASTY, RIGHT;  Surgeon: True Espinoza MD;  Location: [facility] OR;  Service: Orthopedics    Family History   Problem Relation Age of Onset   ? Heart disease Father    ? No Medical Problems Sister    ? No Medical Problems Brother    ? No Medical Problems Sister    ? No Medical Problems Sister    ? No Medical Problems Sister    ? Clotting disorder Neg Hx    ? Anesthesia  problems Neg Hx     Social History     Social History   ? Marital status:      Spouse name: N/A   ? Number of children: 0   ? 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 Smoking status: Never Smoker   ? Smokeless tobacco: Never Used   ? Alcohol use Yes      Comment: last heavy drinking was 4 yr 4months ago, occ lite beer since   ? Drug use: No   ? Sexual activity: No     Other Topics Concern   ? Not on file     Social History Narrative          Medications  Allergies   Current Outpatient Prescriptions   Medication Sig Dispense Refill   ? acetaminophen (TYLENOL) 500 MG tablet 1,000 mg every 6 (six) hours as needed for pain.      ? amLODIPine (NORVASC) 2.5 MG tablet Take 2.5 mg by mouth 2 (two) times a day.  11   ? atorvastatin (LIPITOR) 20 MG tablet Take 20 mg by mouth every morning.     ? carvedilol (COREG) 6.25 MG tablet Take 2 tablets (12.5 mg total) by mouth 2 (two) times a day with meals. (Patient taking differently: Take 6.25 mg by mouth daily. )  0   ? cyanocobalamin 1000 MCG tablet Take 5,000 mcg by mouth daily.      ? famotidine (PEPCID) 20 MG tablet Take 20 mg by mouth 2 (two) times a day.     ? furosemide (LASIX) 40 MG tablet Take 1 tablet (40 mg total) by mouth daily. (Patient taking differently: Take 80 mg by mouth daily. )  0   ? HYDROcodone-acetaminophen 5-325 mg per tablet Take 1-2 tablets by mouth daily as needed for pain. For abdominal pain     ? linaclotide (LINZESS) 145 mcg cap capsule Take 145 mcg by mouth daily before breakfast.     ? lisinopril (PRINIVIL,ZESTRIL) 40 MG tablet Take 40 mg by mouth every morning.      ? multivitamin therapeutic (THERAGRAN) tablet Take 1 tablet by mouth daily.     ? propranolol (INDERAL) 10 MG tablet Take 10 mg by mouth 2 (two) times a day as needed. During high stress situations     ? rivaroxaban (XARELTO) 20 mg Tab Take 20 mg by mouth every morning. Last dose sat 10/31       No current  facility-administered medications for this visit.       No Known Allergies      Lab Results    Chemistry/lipid CBC Cardiac Enzymes/BNP/TSH/INR   Lab Results   Component Value Date    CREATININE 0.89 03/09/2018    BUN 17 03/09/2018    K 4.7 03/09/2018     03/09/2018     03/09/2018    CO2 26 03/09/2018    Lab Results   Component Value Date    WBC 4.6 03/09/2018    HGB 12.1 (L) 03/09/2018    HCT 37.6 (L) 03/09/2018     (H) 03/09/2018     03/09/2018    Lab Results   Component Value Date     (H) 08/04/2016    TSH 2.10 08/04/2016    INR 1.51 (H) 11/04/2015

## 2021-07-03 NOTE — ANESTHESIA PREPROCEDURE EVALUATION
"Anesthesia Preprocedure Evaluation by García Purvis MD at 3/29/2018  8:16 AM     Author: García Purvis MD Service: -- Author Type: Physician    Filed: 3/29/2018  8:58 AM Date of Service: 3/29/2018  8:16 AM Status: Addendum    : García Purvis MD (Physician)    Related Notes: Original Note by García Purvis MD (Physician) filed at 3/29/2018  8:19 AM       Anesthesia Evaluation      Patient summary reviewed   History of anesthetic complications (Hallucinations)     Airway   Mallampati: II  Neck ROM: full   Pulmonary - negative ROS and normal exam    breath sounds clear to auscultation                         Cardiovascular   Exercise tolerance: > or = 4 METS  (+) pacemaker (Pacmaker), hypertension, CAD, dysrhythmias (Chronic a.fib), CHF, , hypercholesterolemia, PVD (s/p CEA, AAA)    (-) murmur  ECG reviewed  Rhythm: regular  Rate: normal,    no murmur      Neuro/Psych    (+) depression,     Endo/Other    (+) arthritis,      Comments: EtOH dependence  Anemia  Gout  MGUS (monoclonal gammopathy of unknown significance)    GI/Hepatic/Renal    (-) GERD    Comments: Abdominal pain  Diverticulosis       Other findings: 3/15/18 Device check  Type: Routine in-clinic IPG check for battery status.  Presenting Rhythm: Ventricular pacing at 60 bpm.  Lead/Battery status: Stable lead and battery measurements. Magnet rate 90 bpm, estimated remaing battery longevity remains at <0.5 years.  Arrhythmias: No ventricular high rate detections. Patient is in atrial fibrillation per surface lead, permanent per history.  Anticoagulant: rivaroxaban  Comments: Normal device function. Patient reports, \"getting fatigued when I walk. It seems more so lately.\" Patient indicates he has \"taken more\" Lasix  lately. Heart rate histograms show blunting considering his activity level; reprogrammed Minute Ventilation High Rate Break Point from 110 to 100 bpm  for better rate response. Patient states he is due to see Dr. Estrada in July 2018. " Routed to Dr. Estrada for review. [facility]      3/21/18 Echo  Summary   Irregular rhythm.   Normal left ventricular size and low normal systolic function.   Left ventricular ejection fraction is visually estimated to be 50-55%.   Abnormal (paradoxical) septal motion consistent with pacing.   Pacemaker lead noted in the right atrium and right ventricle.   Moderately enlarged left atrium.   Moderate to severely enlarged right atrium.   Right ventricular systolic function is mildly reduced.   Mild to moderately dilated right ventricle.   Moderate tricuspid regurgitation.   Compared to 11/2012 the RV appears more dilated.The tricuspid   insufficiency appears moderate on the current study      Dental      Comment: Upper and lower partials                         Anesthesia Plan  Planned anesthetic: MAC    ASA 3     Anesthetic plan and risks discussed with: patient    Post-op plan: routine recovery

## 2021-08-06 NOTE — PROGRESS NOTES
In clinic monthly device check for battery status.  Please see link for full device report.  Patient was informed of results and next follow up during today's visit.